# Patient Record
Sex: FEMALE | Race: BLACK OR AFRICAN AMERICAN | NOT HISPANIC OR LATINO
[De-identification: names, ages, dates, MRNs, and addresses within clinical notes are randomized per-mention and may not be internally consistent; named-entity substitution may affect disease eponyms.]

---

## 2018-03-24 ENCOUNTER — TRANSCRIPTION ENCOUNTER (OUTPATIENT)
Age: 33
End: 2018-03-24

## 2018-03-25 ENCOUNTER — RESULT REVIEW (OUTPATIENT)
Age: 33
End: 2018-03-25

## 2018-03-25 ENCOUNTER — INPATIENT (INPATIENT)
Facility: HOSPITAL | Age: 33
LOS: 0 days | Discharge: HOME | End: 2018-03-25
Attending: OBSTETRICS & GYNECOLOGY | Admitting: OBSTETRICS & GYNECOLOGY
Payer: COMMERCIAL

## 2018-03-25 VITALS
HEART RATE: 71 BPM | DIASTOLIC BLOOD PRESSURE: 80 MMHG | TEMPERATURE: 98 F | OXYGEN SATURATION: 100 % | RESPIRATION RATE: 20 BRPM | SYSTOLIC BLOOD PRESSURE: 128 MMHG

## 2018-03-25 VITALS
OXYGEN SATURATION: 100 % | RESPIRATION RATE: 16 BRPM | HEART RATE: 66 BPM | DIASTOLIC BLOOD PRESSURE: 85 MMHG | SYSTOLIC BLOOD PRESSURE: 134 MMHG

## 2018-03-25 DIAGNOSIS — N83.519 TORSION OF OVARY AND OVARIAN PEDICLE, UNSPECIFIED SIDE: ICD-10-CM

## 2018-03-25 LAB
ALBUMIN SERPL ELPH-MCNC: 4.2 G/DL — SIGNIFICANT CHANGE UP (ref 3.3–5)
ALP SERPL-CCNC: 47 U/L — SIGNIFICANT CHANGE UP (ref 40–120)
ALT FLD-CCNC: 14 U/L — SIGNIFICANT CHANGE UP (ref 4–33)
APPEARANCE UR: SIGNIFICANT CHANGE UP
APTT BLD: 31.3 SEC — SIGNIFICANT CHANGE UP (ref 27.5–37.4)
AST SERPL-CCNC: 18 U/L — SIGNIFICANT CHANGE UP (ref 4–32)
BASOPHILS # BLD AUTO: 0.04 K/UL — SIGNIFICANT CHANGE UP (ref 0–0.2)
BASOPHILS NFR BLD AUTO: 0.6 % — SIGNIFICANT CHANGE UP (ref 0–2)
BILIRUB SERPL-MCNC: 0.3 MG/DL — SIGNIFICANT CHANGE UP (ref 0.2–1.2)
BILIRUB UR-MCNC: NEGATIVE — SIGNIFICANT CHANGE UP
BLD GP AB SCN SERPL QL: NEGATIVE — SIGNIFICANT CHANGE UP
BLOOD UR QL VISUAL: HIGH
BUN SERPL-MCNC: 10 MG/DL — SIGNIFICANT CHANGE UP (ref 7–23)
CALCIUM SERPL-MCNC: 8.7 MG/DL — SIGNIFICANT CHANGE UP (ref 8.4–10.5)
CHLORIDE SERPL-SCNC: 106 MMOL/L — SIGNIFICANT CHANGE UP (ref 98–107)
CO2 SERPL-SCNC: 23 MMOL/L — SIGNIFICANT CHANGE UP (ref 22–31)
COLOR SPEC: SIGNIFICANT CHANGE UP
CREAT SERPL-MCNC: 0.91 MG/DL — SIGNIFICANT CHANGE UP (ref 0.5–1.3)
EOSINOPHIL # BLD AUTO: 0.07 K/UL — SIGNIFICANT CHANGE UP (ref 0–0.5)
EOSINOPHIL NFR BLD AUTO: 1 % — SIGNIFICANT CHANGE UP (ref 0–6)
GLUCOSE SERPL-MCNC: 96 MG/DL — SIGNIFICANT CHANGE UP (ref 70–99)
GLUCOSE UR-MCNC: NEGATIVE — SIGNIFICANT CHANGE UP
HCG SERPL-ACNC: < 5 MIU/ML — SIGNIFICANT CHANGE UP
HCT VFR BLD CALC: 37.4 % — SIGNIFICANT CHANGE UP (ref 34.5–45)
HGB BLD-MCNC: 12.4 G/DL — SIGNIFICANT CHANGE UP (ref 11.5–15.5)
IMM GRANULOCYTES # BLD AUTO: 0.01 # — SIGNIFICANT CHANGE UP
IMM GRANULOCYTES NFR BLD AUTO: 0.1 % — SIGNIFICANT CHANGE UP (ref 0–1.5)
INR BLD: 1.03 — SIGNIFICANT CHANGE UP (ref 0.88–1.17)
KETONES UR-MCNC: NEGATIVE — SIGNIFICANT CHANGE UP
LEUKOCYTE ESTERASE UR-ACNC: NEGATIVE — SIGNIFICANT CHANGE UP
LIDOCAIN IGE QN: 44.7 U/L — SIGNIFICANT CHANGE UP (ref 7–60)
LYMPHOCYTES # BLD AUTO: 1.31 K/UL — SIGNIFICANT CHANGE UP (ref 1–3.3)
LYMPHOCYTES # BLD AUTO: 19.1 % — SIGNIFICANT CHANGE UP (ref 13–44)
MCHC RBC-ENTMCNC: 28.1 PG — SIGNIFICANT CHANGE UP (ref 27–34)
MCHC RBC-ENTMCNC: 33.2 % — SIGNIFICANT CHANGE UP (ref 32–36)
MCV RBC AUTO: 84.8 FL — SIGNIFICANT CHANGE UP (ref 80–100)
MONOCYTES # BLD AUTO: 0.41 K/UL — SIGNIFICANT CHANGE UP (ref 0–0.9)
MONOCYTES NFR BLD AUTO: 6 % — SIGNIFICANT CHANGE UP (ref 2–14)
MUCOUS THREADS # UR AUTO: SIGNIFICANT CHANGE UP
NEUTROPHILS # BLD AUTO: 5.01 K/UL — SIGNIFICANT CHANGE UP (ref 1.8–7.4)
NEUTROPHILS NFR BLD AUTO: 73.2 % — SIGNIFICANT CHANGE UP (ref 43–77)
NITRITE UR-MCNC: NEGATIVE — SIGNIFICANT CHANGE UP
NON-SQ EPI CELLS # UR AUTO: <1 — SIGNIFICANT CHANGE UP
NRBC # FLD: 0 — SIGNIFICANT CHANGE UP
PH UR: 6 — SIGNIFICANT CHANGE UP (ref 4.6–8)
PLATELET # BLD AUTO: 236 K/UL — SIGNIFICANT CHANGE UP (ref 150–400)
PMV BLD: 9.9 FL — SIGNIFICANT CHANGE UP (ref 7–13)
POTASSIUM SERPL-MCNC: 4.1 MMOL/L — SIGNIFICANT CHANGE UP (ref 3.5–5.3)
POTASSIUM SERPL-SCNC: 4.1 MMOL/L — SIGNIFICANT CHANGE UP (ref 3.5–5.3)
PROT SERPL-MCNC: 7.5 G/DL — SIGNIFICANT CHANGE UP (ref 6–8.3)
PROT UR-MCNC: 30 MG/DL — HIGH
PROTHROM AB SERPL-ACNC: 11.9 SEC — SIGNIFICANT CHANGE UP (ref 9.8–13.1)
RBC # BLD: 4.41 M/UL — SIGNIFICANT CHANGE UP (ref 3.8–5.2)
RBC # FLD: 13.2 % — SIGNIFICANT CHANGE UP (ref 10.3–14.5)
RBC CASTS # UR COMP ASSIST: SIGNIFICANT CHANGE UP (ref 0–?)
RH IG SCN BLD-IMP: POSITIVE — SIGNIFICANT CHANGE UP
SODIUM SERPL-SCNC: 142 MMOL/L — SIGNIFICANT CHANGE UP (ref 135–145)
SP GR SPEC: 1.02 — SIGNIFICANT CHANGE UP (ref 1–1.04)
SQUAMOUS # UR AUTO: SIGNIFICANT CHANGE UP
UROBILINOGEN FLD QL: NORMAL MG/DL — SIGNIFICANT CHANGE UP
WBC # BLD: 6.85 K/UL — SIGNIFICANT CHANGE UP (ref 3.8–10.5)
WBC # FLD AUTO: 6.85 K/UL — SIGNIFICANT CHANGE UP (ref 3.8–10.5)
WBC UR QL: SIGNIFICANT CHANGE UP (ref 0–?)

## 2018-03-25 PROCEDURE — 74177 CT ABD & PELVIS W/CONTRAST: CPT | Mod: 26

## 2018-03-25 PROCEDURE — 88305 TISSUE EXAM BY PATHOLOGIST: CPT | Mod: 26

## 2018-03-25 PROCEDURE — 76830 TRANSVAGINAL US NON-OB: CPT | Mod: 26

## 2018-03-25 PROCEDURE — 88307 TISSUE EXAM BY PATHOLOGIST: CPT | Mod: 26

## 2018-03-25 RX ORDER — OXYCODONE HYDROCHLORIDE 5 MG/1
1 TABLET ORAL
Qty: 10 | Refills: 0
Start: 2018-03-25

## 2018-03-25 RX ORDER — ACETAMINOPHEN 500 MG
650 TABLET ORAL ONCE
Qty: 0 | Refills: 0 | Status: COMPLETED | OUTPATIENT
Start: 2018-03-25 | End: 2018-03-25

## 2018-03-25 RX ORDER — SODIUM CHLORIDE 9 MG/ML
1000 INJECTION INTRAMUSCULAR; INTRAVENOUS; SUBCUTANEOUS ONCE
Qty: 0 | Refills: 0 | Status: COMPLETED | OUTPATIENT
Start: 2018-03-25 | End: 2018-03-25

## 2018-03-25 RX ORDER — OXYCODONE HYDROCHLORIDE 5 MG/1
5 TABLET ORAL ONCE
Qty: 0 | Refills: 0 | Status: DISCONTINUED | OUTPATIENT
Start: 2018-03-25 | End: 2018-03-25

## 2018-03-25 RX ORDER — SODIUM CHLORIDE 9 MG/ML
1000 INJECTION, SOLUTION INTRAVENOUS
Qty: 0 | Refills: 0 | Status: DISCONTINUED | OUTPATIENT
Start: 2018-03-25 | End: 2018-03-25

## 2018-03-25 RX ORDER — OXYCODONE HYDROCHLORIDE 5 MG/1
1 TABLET ORAL
Qty: 10 | Refills: 0 | OUTPATIENT
Start: 2018-03-25

## 2018-03-25 RX ORDER — FENTANYL CITRATE 50 UG/ML
25 INJECTION INTRAVENOUS
Qty: 0 | Refills: 0 | Status: DISCONTINUED | OUTPATIENT
Start: 2018-03-25 | End: 2018-03-25

## 2018-03-25 RX ADMIN — FENTANYL CITRATE 25 MICROGRAM(S): 50 INJECTION INTRAVENOUS at 21:05

## 2018-03-25 RX ADMIN — OXYCODONE HYDROCHLORIDE 5 MILLIGRAM(S): 5 TABLET ORAL at 22:20

## 2018-03-25 RX ADMIN — Medication 650 MILLIGRAM(S): at 08:57

## 2018-03-25 RX ADMIN — SODIUM CHLORIDE 1000 MILLILITER(S): 9 INJECTION INTRAMUSCULAR; INTRAVENOUS; SUBCUTANEOUS at 08:57

## 2018-03-25 RX ADMIN — FENTANYL CITRATE 25 MICROGRAM(S): 50 INJECTION INTRAVENOUS at 21:20

## 2018-03-25 RX ADMIN — Medication 650 MILLIGRAM(S): at 14:34

## 2018-03-25 NOTE — ED PROVIDER NOTE - CHIEF COMPLAINT
The patient is a 32y Female complaining of The patient is a 32y Female complaining of abdominal pain

## 2018-03-25 NOTE — ASU DISCHARGE PLAN (ADULT/PEDIATRIC). - NOTIFY
Pain not relieved by Medications/Unable to Urinate/Bleeding that does not stop/Swelling that continues/Inability to Tolerate Liquids or Foods/GYN Fever>100.4/Numbness, color, or temperature change to extremity/Persistent Nausea and Vomiting

## 2018-03-25 NOTE — ED PROVIDER NOTE - ATTENDING CONTRIBUTION TO CARE
Raymond BRYAN- 31 Y/O F with h/o ectopic pregnancy  LMP today, prior to that 18 p/w left gorin pain for 3 days worst today  but no dysuria, hematuria. Pt has nausea, vomiting. diarrhea for 2 days btu that has resolved and she got her periods today, appears like her regular periods. No fever, chills, vag discharge.     pt is alert, well appearing female, s1s2 normal reg, b/l clear breath sounds, abd soft, nt, nd, normal bowel sounds, no hernias noted in groin or femoral area, pelvic exam normal, mild menstrual blood, os closed, no adnexal tenderness, uterus intrapelvic non tender, no cmt, neuro exam aox3, cn 2-12 intact, 5/5 strenght in all ext, full rom at all joints, skin warm, dry, good turgor, normal ext pulses    ddx includes dysmenorrhea, fibroid degeneration, ovarian cyst, unlikely torsion due to consisent pain, will r/o pregnancy

## 2018-03-25 NOTE — ED PROVIDER NOTE - MEDICAL DECISION MAKING DETAILS
32F, pmh of ectopic pregnancy presenting with LLQ pain. no tenderness on abdominal or pelvic exam. low concern for ectopic or torsion but cannot r/o. plan for cbc, cmp, hcg, lipase, coags, transvaginal u/s. will reassess.

## 2018-03-25 NOTE — ED PROVIDER NOTE - PHYSICAL EXAMINATION
General: well appearing female in no acute distress   Respiratory: normal work of breathing, lungs clear to auscultation bilaterally   Cardiac: regular rate and rhythm   Abdomen: soft, non-tender, no CVA tenderness   : normal external female genitalia, blood in vaginal vault, cervix without lesions, no CMT or adnexal tenderness   MSK: no swelling or tenderness of lower extremities   Neuro: A&Ox3 General: well appearing female in no acute distress   Respiratory: normal work of breathing, lungs clear to auscultation bilaterally   Cardiac: regular rate and rhythm   Abdomen: soft, non-tender, no CVA tenderness   : normal external female genitalia, blood in vaginal vault, cervix without lesions, no CMT or adnexal tenderness (chaperone Dr. Andrade)   MSK: no swelling or tenderness of lower extremities   Neuro: A&Ox3

## 2018-03-25 NOTE — PROGRESS NOTE ADULT - GENITOURINARY FEMALE
normal external genitalia/bleeding, (menses)  mass in the right pelvis palpated ,  [place] the right side has a mass the left side is tender.

## 2018-03-25 NOTE — PROGRESS NOTE ADULT - SUBJECTIVE AND OBJECTIVE BOX
18 @ 15:10  LAYLA CALVIN  [facility] ED    HPI:  31 y/o  menses started today (3/25) presenting for LLQ pain. Pain started on 3/22, is sharp and "holding" in nature, and was associated with two episodes of vomiting and diarrhea on 3/22. Since 3/22, no further vomiting, but pain has slowly gotten worse. It is constant in nature. She saw her PMD yesterday and was given a naproxen prescription, which helped with the pain but the pain returned once the medication wore off. Pt reports pain was an 8/10 this morning so she came to the ED. Pain is currently a 5/10 after receiving Tylenol in the ED earlier today. Pain is slightly worse with sitting up. No CP, SOB, current N/V, fevers, chills, dysuria. Reports mild vaginal bleeding consistent with her usual menses. No vaginal discharge. Pt sees Dr. Avila for OB/Gyn care.  OBHx: 2x , 1x ectopic pregnancy s/p surgery that pt describes as removal of just the ectopic (not the whole tube) through one incision in her LLQ  GynHx: Reports being told she had fibroids once, no treatment, denies hx of ovarian cysts. Is , has not had sex in a few weeks  PMH: Denies  SHx: Surgery for ectopic as above  Meds: Naproxen  All: NKDA (25 Mar 2018 14:20)        T(C): 37.2 (18 @ 13:52), Max: 37.2 (18 @ 13:52)  HR: 64 (18 @ 13:52) (64 - 71)  BP: 117/75 (18 @ 13:52) (117/75 - 128/80)  RR: 18 (18 @ 13:52) (18 - 20)  SpO2: 100% (18 @ 13:52) (100% - 100%)                        12.4   6.85  )-----------( 236      ( 25 Mar 2018 08:50 )             37.4     notes:              Impression  \  Recommendations:  This 32y Female with    - further recommendations based on clinical progress, laboratory and imaging data

## 2018-03-25 NOTE — H&P ADULT - HISTORY OF PRESENT ILLNESS
33 y/o  menses started today (3/25) presenting for LLQ pain. Pain started on 3/22, is sharp and "holding" in nature, and was associated with two episodes of vomiting and diarrhea on 3/22. Since 3/22, no further vomiting, but pain has slowly gotten worse. It is constant in nature. She saw her PMD yesterday and was given a naproxen prescription, which helped with the pain but the pain returned once the medication wore off. Pt reports pain was an 8/10 this morning so she came to the ED. Pain is currently a 5/10 after receiving Tylenol in the ED earlier today. Pain is slightly worse with sitting up. No CP, SOB, current N/V, fevers, chills, dysuria. Reports mild vaginal bleeding consistent with her usual menses. No vaginal discharge. Pt sees Dr. Avila for OB/Gyn care.  OBHx: 2x , 1x ectopic pregnancy s/p surgery that pt describes as removal of just the ectopic (not the whole tube) through one incision in her LLQ  GynHx: Reports being told she had fibroids once, no treatment, denies hx of ovarian cysts. Is , has not had sex in a few weeks  PMH: Denies  SHx: Surgery for ectopic as above  Meds: Naproxen  All: NKDA

## 2018-03-25 NOTE — H&P ADULT - NSHPPHYSICALEXAM_GEN_ALL_CORE
VS  T(C): 37.2 (03-25-18 @ 13:52)  HR: 64 (03-25-18 @ 13:52)  BP: 117/75 (03-25-18 @ 13:52)  RR: 18 (03-25-18 @ 13:52)  SpO2: 100% (03-25-18 @ 13:52)    Physical Exam:  General: NAD  Abdomen: Soft, non-distended, tender to palpation of LLQ, no rebound, no guarding  Pelvic: Labia wnl w/o lesions or erythema, minimal bleeding in vaginal vault, no cervical discharge or erythema, no CMT, uterus anteverted and not enlarged, adnexa tender to palpation b/l, worse on left than right, left adnexa without masses, palpable adnexal mass in right adnexa that is minimally tender to palpation but less so than her left adnexal region

## 2018-03-25 NOTE — BRIEF OPERATIVE NOTE - OPERATION/FINDINGS
Normal uterus, normal right tube and ovary, left ovary displaced to the right with it's pedicle and left fallopian tube twisted on itself multiple times, enlarged ~7 cm left ovary with multiple ecchymotic cystic lesions

## 2018-03-25 NOTE — H&P ADULT - PROBLEM SELECTOR PLAN 1
- Add on to OR for diagnostic laparoscopy  - NPO, IVF  - Pain medication prn    Pt seen and examined with Dr. Austin Hagan, PGY-2

## 2018-03-25 NOTE — ASU DISCHARGE PLAN (ADULT/PEDIATRIC). - ACTIVITY LEVEL
no tub baths/nothing per rectum/no intercourse/no tampons/nothing per vagina/no douching/weight bearing as tolerated

## 2018-03-25 NOTE — PROGRESS NOTE ADULT - ASSESSMENT
Consent for operation discussed the case with PAM VEGA MD who suggest that a CTscan be done to rule out gastrointestional pathology as a surgical team may be needed.

## 2018-03-25 NOTE — ED ADULT NURSE NOTE - OBJECTIVE STATEMENT
Patient presented to ED A&O x4, with c/o LLQ abdominal pain x 3 days with N/V/D, which has since resolved Blood work drawn and sent to lab.  ER physician evaluated patient, medication and IVF ordered and administered.  Plan is for sono and pending. Will continue to monitor patient closely. Diane KUO

## 2018-03-25 NOTE — H&P ADULT - NSHPLABSRESULTS_GEN_ALL_CORE
12.4   6.85  )-----------( 236      ( 03-25 @ 08:50 )             37.4     PT/INR - ( 03-25 @ 08:50 )   PT: 11.9 SEC;   INR: 1.03     PTT - ( 03-25 @ 08:50 )  PTT:31.3 SEC    03-25 @ 08:50    142  |  106  |  10  ----------------------------<  96  4.1   |  23  |  0.91    Ca    8.7      03-25 @ 08:50    TPro  7.5  /  Alb  4.2  /  TBili  0.3  /  DBili  x   /  AST  18  /  ALT  14  /  AlkPhos  47  03-25 @ 08:50    Blood Type: B Positive  Antibody Screen: Negative    HCG Quantitative, Serum (03.25.18 @ 08:50)    HCG Quantitative, Serum: < 5.0    < from: US Transvaginal (03.25.18 @ 11:11) >    EXAM:  US TRANSVAGINAL        *** ADDENDUM 03/25/2018  ***    Please note, there are two typographical errors in the body of the   report. The last sentence under the "Left Ovary" section should read:   Although Doppler flow is demonstrated to the LEFT, this does not exclude   the possibility of torsion.    In addition, the last sentence under the "Right Ovary" section should   read: Doppler flow is demonstrated to the RIGHT ovary.      *** END OF ADDENDUM 03/25/2018  ***      PROCEDURE DATE:  Mar 25 2018         INTERPRETATION:  CLINICAL INFORMATION: Persistent left lower quadrant   pain for 3 days. Evaluate for torsion. Negative pregnancy test.    LMP: 2/25/2018.    COMPARISON: None available.    TECHNIQUE:     Transabdominal and Endovaginal pelvic sonogram. Color and Spectral   Doppler was performed.    FINDINGS:    Uterus: 8.0 x 4.1 x 5.6 cm. Within normal limits.    Endometrium: 6 mm. Within normal limits.    Left ovary: Left ovary measures 7.9 x 4.1 x 6.8 cm, inclusive of a large   heterogeneous partially cystic mass. Echogenic components of the mass are   suggestive of fat. The left ovary demonstrates heterogeneous echotexture.   Left ovarian location at the midline may be secondary to mass effect or   ovarian torsion. Although Doppler flow is demonstrated to the right   ovary, this does not exclude the possibility of torsion.    Right ovary: 3.8 x 1.9 x 2.0 cm. Within normal limits. Doppler flow is   demonstrated to the 8 ovary.    Fluid: Small volume free fluid within the pelvis.    IMPRESSION:  1.  Large partially cystic mass of the left ovary with echogenic   component suggestive of fat, likely representing a dermoid. Heterogeneous   echotexture of the left ovary with abnormal location at the midline could   suggest left ovarian torsion should the patient have appropriate clinical   symptoms.  2.  Dr. Daniels discussed the above findings with Dr. Andrade of the   emergency department at 11:10 AM on 3/25/2018 with read back.        ***Please see the addendum at the top of this report. It may contain   additional important information or changes.****    < end of copied text >

## 2018-03-25 NOTE — ED PROVIDER NOTE - PROGRESS NOTE DETAILS
u/s concerning for ovarian torsion. plan for gyn consult. patient updated on results. - resident Mikey Alejandra Raymond BRYAN- pt seen by gyn and recommend to admit for lap diagnostic surgery for ovarian torsion

## 2018-03-25 NOTE — ASU DISCHARGE PLAN (ADULT/PEDIATRIC). - MEDICATION SUMMARY - MEDICATIONS TO TAKE
I will START or STAY ON the medications listed below when I get home from the hospital:    ibuprofen 600 mg oral tablet  -- 1 tab(s) by mouth every 6 hours  -- Indication: For Pain    acetaminophen 500 mg oral tablet  -- 2 tab(s) by mouth every 6 hours, As Needed  -- Indication: For Pain    oxyCODONE 5 mg oral tablet  -- 1 tab(s) by mouth every 4 to 6 hours, As Needed -for severe pain MDD:6 tabs   -- Caution federal law prohibits the transfer of this drug to any person other  than the person for whom it was prescribed.  It is very important that you take or use this exactly as directed.  Do not skip doses or discontinue unless directed by your doctor.  May cause drowsiness.  Alcohol may intensify this effect.  Use care when operating dangerous machinery.  This prescription cannot be refilled.  Using more of this medication than prescribed may cause serious breathing problems.    -- Indication: For Pain

## 2018-03-25 NOTE — ED PROVIDER NOTE - OBJECTIVE STATEMENT
32F, PMH of ectopic pregnancy presenting with three days of LLQ pain. Patient reports pain began suddenly while at work, had two episodes of vomiting and diarrhea that day which have since resolved. Pain is worse today, has not moved. Denies fever, back and pelvic pain. Patient began menstruating today, reports this is the expected time.

## 2018-03-25 NOTE — ED ADULT TRIAGE NOTE - CHIEF COMPLAINT QUOTE
Pt with LLQ pain since thurday worse today with nausea and 2 episodes of diarrhea. LMP currently menstruating.

## 2018-03-25 NOTE — BRIEF OPERATIVE NOTE - PROCEDURE
<<-----Click on this checkbox to enter Procedure Laparoscopic oophorectomy, left  03/25/2018    [facility]

## 2018-03-26 LAB
BACTERIA UR CULT: SIGNIFICANT CHANGE UP
SPECIMEN SOURCE: SIGNIFICANT CHANGE UP

## 2018-03-29 LAB — SURGICAL PATHOLOGY STUDY: SIGNIFICANT CHANGE UP

## 2018-04-14 ENCOUNTER — RESULT REVIEW (OUTPATIENT)
Age: 33
End: 2018-04-14

## 2019-06-14 NOTE — PROGRESS NOTE ADULT - BACK
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

## 2022-10-28 PROBLEM — O00.90 UNSPECIFIED ECTOPIC PREGNANCY WITHOUT INTRAUTERINE PREGNANCY: Chronic | Status: ACTIVE | Noted: 2018-03-25

## 2022-11-21 ENCOUNTER — TRANSCRIPTION ENCOUNTER (OUTPATIENT)
Age: 37
End: 2022-11-21

## 2022-11-21 ENCOUNTER — APPOINTMENT (OUTPATIENT)
Dept: OBGYN | Facility: CLINIC | Age: 37
End: 2022-11-21

## 2022-11-21 VITALS
DIASTOLIC BLOOD PRESSURE: 79 MMHG | BODY MASS INDEX: 24.41 KG/M2 | WEIGHT: 143 LBS | SYSTOLIC BLOOD PRESSURE: 129 MMHG | HEIGHT: 64 IN

## 2022-11-21 DIAGNOSIS — N91.2 AMENORRHEA, UNSPECIFIED: ICD-10-CM

## 2022-11-21 DIAGNOSIS — Z86.018 PERSONAL HISTORY OF OTHER BENIGN NEOPLASM: ICD-10-CM

## 2022-11-21 PROCEDURE — 36415 COLL VENOUS BLD VENIPUNCTURE: CPT

## 2022-11-21 PROCEDURE — 99204 OFFICE O/P NEW MOD 45 MIN: CPT

## 2022-11-21 PROCEDURE — P9615: CPT

## 2022-11-21 NOTE — PLAN
[FreeTextEntry1] : 37 year old P2 at 9w6d by LMP presenting with amenorrhea.\par -f/u PAP and GC/CT done today\par -f/u prenatal blood work drawn today\par -Rx sent for PNV\par -f/u 2 weeks for NT/dating. \par

## 2022-11-21 NOTE — HISTORY OF PRESENT ILLNESS
[N] : Patient does not use contraception [Monogamous] : is monogamous [Y] : Positive pregnancy history [Currently Active] : currently active [Men] : men [Vaginal] : vaginal [No] : No [MensesFreq] : 30 [LMPDate] : 09/15/2022 [MensesLength] : 5 [PGHxTotal] : 3 [[facility]Full[facility]] : 2 [PGHxPremature] : 0 [PGHxAbortions] : 0 [[facility]Living] : 2 [FreeTextEntry1] : NSVDX2

## 2022-11-23 LAB
ABO + RH PNL BLD: NORMAL
APPEARANCE: CLEAR
BACTERIA UR CULT: NORMAL
BACTERIA: NEGATIVE
BASOPHILS # BLD AUTO: 0.03 K/UL
BASOPHILS NFR BLD AUTO: 0.3 %
BILIRUBIN URINE: NEGATIVE
BLD GP AB SCN SERPL QL: NORMAL
BLOOD URINE: NEGATIVE
C TRACH RRNA SPEC QL NAA+PROBE: NOT DETECTED
COLOR: NORMAL
EOSINOPHIL # BLD AUTO: 0.08 K/UL
EOSINOPHIL NFR BLD AUTO: 0.7 %
GLUCOSE QUALITATIVE U: NEGATIVE
GLUCOSE SERPL-MCNC: 92 MG/DL
HAV IGM SER QL: NONREACTIVE
HBV CORE IGM SER QL: NONREACTIVE
HBV SURFACE AG SER QL: NONREACTIVE
HCT VFR BLD CALC: 38 %
HCV AB SER QL: NONREACTIVE
HCV S/CO RATIO: 0.06 S/CO
HGB BLD-MCNC: 12.4 G/DL
HIV1+2 AB SPEC QL IA.RAPID: NONREACTIVE
HPV HIGH+LOW RISK DNA PNL CVX: NOT DETECTED
HYALINE CASTS: 1 /LPF
IMM GRANULOCYTES NFR BLD AUTO: 0.3 %
KETONES URINE: NEGATIVE
LEAD BLD-MCNC: <1 UG/DL
LEUKOCYTE ESTERASE URINE: NEGATIVE
LYMPHOCYTES # BLD AUTO: 2.43 K/UL
LYMPHOCYTES NFR BLD AUTO: 20.5 %
MAN DIFF?: NORMAL
MCHC RBC-ENTMCNC: 28.6 PG
MCHC RBC-ENTMCNC: 32.6 GM/DL
MCV RBC AUTO: 87.6 FL
MEV IGG FLD QL IA: 76.9 AU/ML
MEV IGG+IGM SER-IMP: POSITIVE
MICROSCOPIC-UA: NORMAL
MONOCYTES # BLD AUTO: 0.92 K/UL
MONOCYTES NFR BLD AUTO: 7.8 %
MUV AB SER-ACNC: POSITIVE
MUV IGG SER QL IA: >300 AU/ML
N GONORRHOEA RRNA SPEC QL NAA+PROBE: NOT DETECTED
NEUTROPHILS # BLD AUTO: 8.36 K/UL
NEUTROPHILS NFR BLD AUTO: 70.4 %
NITRITE URINE: NEGATIVE
PH URINE: 6
PLATELET # BLD AUTO: 288 K/UL
PROTEIN URINE: NEGATIVE
RBC # BLD: 4.34 M/UL
RBC # FLD: 13.6 %
RED BLOOD CELLS URINE: 1 /HPF
RUBV IGG FLD-ACNC: 13.2 INDEX
RUBV IGG SER-IMP: POSITIVE
SOURCE AMPLIFICATION: NORMAL
SPECIFIC GRAVITY URINE: 1.01
SQUAMOUS EPITHELIAL CELLS: 0 /HPF
T PALLIDUM AB SER QL IA: NEGATIVE
UROBILINOGEN URINE: NORMAL
WBC # FLD AUTO: 11.86 K/UL
WHITE BLOOD CELLS URINE: 0 /HPF

## 2022-11-25 ENCOUNTER — NON-APPOINTMENT (OUTPATIENT)
Age: 37
End: 2022-11-25

## 2022-11-25 LAB
ESTIMATED AVERAGE GLUCOSE: 120 MG/DL
HBA1C MFR BLD HPLC: 5.8 %
HGB A MFR BLD: 55.5 %
HGB A2 MFR BLD: 3.1 %
HGB FRACT BLD-IMP: NORMAL
HGB S BLD QL SOLY: POSITIVE
HGB S MFR BLD: 41.4 %
HPV 16 E6+E7 MRNA CVX QL NAA+PROBE: NOT DETECTED
HPV18+45 E6+E7 MRNA CVX QL NAA+PROBE: NOT DETECTED
M TB IFN-G BLD-IMP: NEGATIVE
QUANTIFERON TB PLUS MITOGEN MINUS NIL: >10 IU/ML
QUANTIFERON TB PLUS NIL: 0.02 IU/ML
QUANTIFERON TB PLUS TB1 MINUS NIL: 0 IU/ML
QUANTIFERON TB PLUS TB2 MINUS NIL: 0 IU/ML

## 2022-11-28 LAB — FMR1 GENE MUT ANL BLD/T: NORMAL

## 2022-11-29 ENCOUNTER — NON-APPOINTMENT (OUTPATIENT)
Age: 37
End: 2022-11-29

## 2022-11-29 LAB — AR GENE MUT ANL BLD/T: NORMAL

## 2022-12-01 ENCOUNTER — NON-APPOINTMENT (OUTPATIENT)
Age: 37
End: 2022-12-01

## 2022-12-01 LAB
CFTR MUT TESTED BLD/T: NEGATIVE
CYTOLOGY CVX/VAG DOC THIN PREP: ABNORMAL

## 2022-12-07 ENCOUNTER — APPOINTMENT (OUTPATIENT)
Dept: ANTEPARTUM | Facility: CLINIC | Age: 37
End: 2022-12-07

## 2022-12-07 ENCOUNTER — APPOINTMENT (OUTPATIENT)
Dept: OBGYN | Facility: CLINIC | Age: 37
End: 2022-12-07

## 2022-12-07 VITALS
BODY MASS INDEX: 24.11 KG/M2 | SYSTOLIC BLOOD PRESSURE: 114 MMHG | WEIGHT: 141.25 LBS | DIASTOLIC BLOOD PRESSURE: 75 MMHG | HEIGHT: 64 IN

## 2022-12-07 PROCEDURE — 76813 OB US NUCHAL MEAS 1 GEST: CPT

## 2022-12-07 PROCEDURE — 76801 OB US < 14 WKS SINGLE FETUS: CPT | Mod: 59

## 2022-12-07 PROCEDURE — 0502F SUBSEQUENT PRENATAL CARE: CPT

## 2022-12-12 ENCOUNTER — NON-APPOINTMENT (OUTPATIENT)
Age: 37
End: 2022-12-12

## 2022-12-12 LAB
ADDITIONAL US: NORMAL
CRL SCAN TWIN B: NORMAL
CRL SCAN: NORMAL
CROWN RUMP LENGTH TWIN B: NORMAL
CROWN RUMP LENGTH: 62.5 MM
DIA MOM: 1.54
DIA VALUE: 369.7 PG/ML
DOWN SYNDROME AGE RISK: NORMAL
DOWN SYNDROME INTERPRETATION: NORMAL
DOWN SYNDROME SCREENING RISK: NORMAL
FIRST TRIMESTER SCREEN COMMENTS: NORMAL
FIRST TRIMESTER SCREEN NOTE: NORMAL
FIRST TRIMESTER SCREEN RESULTS: NORMAL
FIRST TRIMESTER SCREEN TEST RESULTS: NORMAL
GEST. AGE ON COLLECTION DATE: 12.4 WEEKS
HCG MOM: 1.06
HCG VALUE: 109.4 IU/ML
MATERNAL AGE AT EDD: 39.6 YR
NT MOM TWIN B: NORMAL
NT TWIN B: NORMAL
NUCHAL TRANSLUCENCY (NT): 1.4 MM
NUCHAL TRANSLUCENCY MOM: 1.05
NUMBER OF FETUSES: 1
PAPP-A MOM: 0.7
PAPP-A VALUE: 707.9 NG/ML
RACE: NORMAL
SONOGRAPHER ID#: NORMAL
TRISOMY 18 AGE RISK: NORMAL
TRISOMY 18 INTERPRETATION: NORMAL
TRISOMY 18 SCREENING RISK: NORMAL
WEIGHT AFP: 141 LBS

## 2022-12-19 LAB
ALL CHROMOSOMES INTERPRETATION: NORMAL
ALL CHROMOSOMES TEST RESULT: NORMAL
CHROMOSOME13 INTERPRETATION: NORMAL
CHROMOSOME13 TEST RESULT: NORMAL
CHROMOSOME18 INTERPRETATION: NORMAL
CHROMOSOME18 TEST RESULT: NORMAL
CHROMOSOME21 INTERPRETATION: NORMAL
CHROMOSOME21 TEST RESULT: NORMAL
FETAL FRACTION: NORMAL
MICRODELETIONS INTERPRETATION: NORMAL
MICRODELETIONS RESULT: NORMAL
PERFORMANCE AND LIMITATIONS: NORMAL
SEX CHROMOSOME INTERPRETATION: NORMAL
SEX CHROMOSOME TEST RESULT: NORMAL
VERIFI PRENATAL TEST: NOT DETECTED

## 2023-01-04 ENCOUNTER — APPOINTMENT (OUTPATIENT)
Dept: OBGYN | Facility: CLINIC | Age: 38
End: 2023-01-04
Payer: COMMERCIAL

## 2023-01-04 VITALS
BODY MASS INDEX: 24.41 KG/M2 | DIASTOLIC BLOOD PRESSURE: 75 MMHG | SYSTOLIC BLOOD PRESSURE: 109 MMHG | HEIGHT: 64 IN | WEIGHT: 143 LBS

## 2023-01-04 PROCEDURE — 0502F SUBSEQUENT PRENATAL CARE: CPT

## 2023-01-05 ENCOUNTER — APPOINTMENT (OUTPATIENT)
Dept: OBGYN | Facility: CLINIC | Age: 38
End: 2023-01-05
Payer: COMMERCIAL

## 2023-01-05 PROCEDURE — ZZZZZ: CPT

## 2023-01-11 LAB
AFP MOM: 0.73
AFP VALUE: 26.1 NG/ML
ALPHA FETOPROTEIN SERUM COMMENT: NORMAL
ALPHA FETOPROTEIN SERUM INTERPRETATION: NORMAL
ALPHA FETOPROTEIN SERUM RESULTS: NORMAL
ALPHA FETOPROTEIN SERUM TEST RESULTS: NORMAL
GESTATIONAL AGE BASED ON: NORMAL
GESTATIONAL AGE ON COLLECTION DATE: 15.9 WEEKS
INSULIN DEP DIABETES: NO
MATERNAL AGE AT EDD AFP: 39.5 YR
MULTIPLE GESTATION: NO
OSBR RISK 1 IN: NORMAL
RACE: NORMAL
WEIGHT AFP: 143 LBS

## 2023-01-24 ENCOUNTER — NON-APPOINTMENT (OUTPATIENT)
Age: 38
End: 2023-01-24

## 2023-01-24 ENCOUNTER — LABORATORY RESULT (OUTPATIENT)
Age: 38
End: 2023-01-24

## 2023-02-01 ENCOUNTER — APPOINTMENT (OUTPATIENT)
Dept: OBGYN | Facility: CLINIC | Age: 38
End: 2023-02-01
Payer: COMMERCIAL

## 2023-02-01 ENCOUNTER — APPOINTMENT (OUTPATIENT)
Dept: ANTEPARTUM | Facility: CLINIC | Age: 38
End: 2023-02-01
Payer: COMMERCIAL

## 2023-02-01 VITALS — SYSTOLIC BLOOD PRESSURE: 116 MMHG | DIASTOLIC BLOOD PRESSURE: 76 MMHG | BODY MASS INDEX: 25.75 KG/M2 | WEIGHT: 150 LBS

## 2023-02-01 PROCEDURE — 76811 OB US DETAILED SNGL FETUS: CPT

## 2023-02-01 PROCEDURE — 0502F SUBSEQUENT PRENATAL CARE: CPT

## 2023-02-01 PROCEDURE — 36415 COLL VENOUS BLD VENIPUNCTURE: CPT

## 2023-03-01 ENCOUNTER — APPOINTMENT (OUTPATIENT)
Dept: OBGYN | Facility: CLINIC | Age: 38
End: 2023-03-01
Payer: COMMERCIAL

## 2023-03-01 VITALS
SYSTOLIC BLOOD PRESSURE: 118 MMHG | DIASTOLIC BLOOD PRESSURE: 80 MMHG | WEIGHT: 148 LBS | HEIGHT: 64 IN | BODY MASS INDEX: 25.27 KG/M2

## 2023-03-01 PROCEDURE — 0502F SUBSEQUENT PRENATAL CARE: CPT

## 2023-03-01 PROCEDURE — 36415 COLL VENOUS BLD VENIPUNCTURE: CPT

## 2023-03-02 ENCOUNTER — NON-APPOINTMENT (OUTPATIENT)
Age: 38
End: 2023-03-02

## 2023-03-02 LAB
BASOPHILS # BLD AUTO: 0.06 K/UL
BASOPHILS NFR BLD AUTO: 0.5 %
EOSINOPHIL # BLD AUTO: 0.07 K/UL
EOSINOPHIL NFR BLD AUTO: 0.6 %
GLUCOSE 1H P 50 G GLC PO SERPL-MCNC: 124 MG/DL
HCT VFR BLD CALC: 33.6 %
HGB BLD-MCNC: 10.4 G/DL
IMM GRANULOCYTES NFR BLD AUTO: 0.6 %
LYMPHOCYTES # BLD AUTO: 2.02 K/UL
LYMPHOCYTES NFR BLD AUTO: 16 %
MAN DIFF?: NORMAL
MCHC RBC-ENTMCNC: 28.3 PG
MCHC RBC-ENTMCNC: 31 GM/DL
MCV RBC AUTO: 91.3 FL
MONOCYTES # BLD AUTO: 0.89 K/UL
MONOCYTES NFR BLD AUTO: 7.1 %
NEUTROPHILS # BLD AUTO: 9.48 K/UL
NEUTROPHILS NFR BLD AUTO: 75.2 %
PLATELET # BLD AUTO: 274 K/UL
RBC # BLD: 3.68 M/UL
RBC # FLD: 13.9 %
WBC # FLD AUTO: 12.6 K/UL

## 2023-03-29 ENCOUNTER — APPOINTMENT (OUTPATIENT)
Dept: OBGYN | Facility: CLINIC | Age: 38
End: 2023-03-29
Payer: COMMERCIAL

## 2023-03-29 VITALS — SYSTOLIC BLOOD PRESSURE: 114 MMHG | DIASTOLIC BLOOD PRESSURE: 71 MMHG | WEIGHT: 150 LBS | BODY MASS INDEX: 25.75 KG/M2

## 2023-03-29 PROCEDURE — 90715 TDAP VACCINE 7 YRS/> IM: CPT

## 2023-03-29 PROCEDURE — 0502F SUBSEQUENT PRENATAL CARE: CPT

## 2023-03-29 PROCEDURE — 90471 IMMUNIZATION ADMIN: CPT

## 2023-04-17 ENCOUNTER — APPOINTMENT (OUTPATIENT)
Dept: OBGYN | Facility: CLINIC | Age: 38
End: 2023-04-17
Payer: COMMERCIAL

## 2023-04-17 ENCOUNTER — APPOINTMENT (OUTPATIENT)
Dept: ANTEPARTUM | Facility: CLINIC | Age: 38
End: 2023-04-17
Payer: COMMERCIAL

## 2023-04-17 VITALS — WEIGHT: 152 LBS | SYSTOLIC BLOOD PRESSURE: 113 MMHG | BODY MASS INDEX: 26.09 KG/M2 | DIASTOLIC BLOOD PRESSURE: 72 MMHG

## 2023-04-17 PROCEDURE — 76819 FETAL BIOPHYS PROFIL W/O NST: CPT | Mod: 59

## 2023-04-17 PROCEDURE — 0502F SUBSEQUENT PRENATAL CARE: CPT

## 2023-04-17 PROCEDURE — 76816 OB US FOLLOW-UP PER FETUS: CPT

## 2023-05-01 ENCOUNTER — APPOINTMENT (OUTPATIENT)
Dept: OBGYN | Facility: CLINIC | Age: 38
End: 2023-05-01
Payer: COMMERCIAL

## 2023-05-01 VITALS — BODY MASS INDEX: 26.26 KG/M2 | WEIGHT: 153 LBS | SYSTOLIC BLOOD PRESSURE: 117 MMHG | DIASTOLIC BLOOD PRESSURE: 74 MMHG

## 2023-05-01 PROCEDURE — 0502F SUBSEQUENT PRENATAL CARE: CPT

## 2023-05-15 ENCOUNTER — APPOINTMENT (OUTPATIENT)
Dept: OBGYN | Facility: CLINIC | Age: 38
End: 2023-05-15
Payer: COMMERCIAL

## 2023-05-15 VITALS
WEIGHT: 153 LBS | DIASTOLIC BLOOD PRESSURE: 68 MMHG | BODY MASS INDEX: 26.12 KG/M2 | SYSTOLIC BLOOD PRESSURE: 111 MMHG | HEIGHT: 64 IN

## 2023-05-15 DIAGNOSIS — D57.3 SICKLE-CELL TRAIT: ICD-10-CM

## 2023-05-15 PROCEDURE — 0502F SUBSEQUENT PRENATAL CARE: CPT

## 2023-05-19 LAB
FERRITIN SERPL-MCNC: 15 NG/ML
FOLATE SERPL-MCNC: >20 NG/ML
IRON SATN MFR SERPL: 4 %
IRON SERPL-MCNC: 26 UG/DL
TIBC SERPL-MCNC: 618 UG/DL
UIBC SERPL-MCNC: 592 UG/DL
VIT B12 SERPL-MCNC: 677 PG/ML

## 2023-05-31 ENCOUNTER — OUTPATIENT (OUTPATIENT)
Dept: OUTPATIENT SERVICES | Facility: HOSPITAL | Age: 38
LOS: 1 days | Discharge: HOME | End: 2023-05-31

## 2023-05-31 ENCOUNTER — NON-APPOINTMENT (OUTPATIENT)
Age: 38
End: 2023-05-31

## 2023-05-31 ENCOUNTER — APPOINTMENT (OUTPATIENT)
Dept: OBGYN | Facility: CLINIC | Age: 38
End: 2023-05-31
Payer: COMMERCIAL

## 2023-05-31 VITALS — SYSTOLIC BLOOD PRESSURE: 112 MMHG | DIASTOLIC BLOOD PRESSURE: 72 MMHG | BODY MASS INDEX: 26.09 KG/M2 | WEIGHT: 152 LBS

## 2023-05-31 DIAGNOSIS — D64.9 ANEMIA, UNSPECIFIED: ICD-10-CM

## 2023-05-31 PROCEDURE — 0502F SUBSEQUENT PRENATAL CARE: CPT

## 2023-06-01 ENCOUNTER — APPOINTMENT (OUTPATIENT)
Dept: HEMATOLOGY ONCOLOGY | Facility: CLINIC | Age: 38
End: 2023-06-01
Payer: COMMERCIAL

## 2023-06-01 VITALS
HEIGHT: 64 IN | BODY MASS INDEX: 25.86 KG/M2 | SYSTOLIC BLOOD PRESSURE: 104 MMHG | DIASTOLIC BLOOD PRESSURE: 72 MMHG | HEART RATE: 88 BPM | RESPIRATION RATE: 16 BRPM | WEIGHT: 151.46 LBS | OXYGEN SATURATION: 97 %

## 2023-06-01 DIAGNOSIS — Z33.1 PREGNANT STATE, INCIDENTAL: ICD-10-CM

## 2023-06-01 DIAGNOSIS — Z86.32 PERSONAL HISTORY OF GESTATIONAL DIABETES: ICD-10-CM

## 2023-06-01 PROCEDURE — 99205 OFFICE O/P NEW HI 60 MIN: CPT

## 2023-06-01 NOTE — RESULTS/DATA
[FreeTextEntry1] : 6/1/2023\par Review of recent labs \par HGB 9.7\par MCV 84.5\par Ferritin 15\par TIBC 618\par

## 2023-06-01 NOTE — HISTORY OF PRESENT ILLNESS
[de-identified] : (6/1/2023) 37 year-old Ms. CALVIN is seen in consult for anemia in pregnancy at 36 weeks. Patient's most recent (5/31/2023) hemoglobin is 9.7, MCV 84.5. Patient is pregnant with her third child. Patient never needed blood transfusion or iron infusion. She is not on PO iron. Denies any history of bleeding.

## 2023-06-01 NOTE — ASSESSMENT
[FreeTextEntry1] : 37 year-old Ms. CALVIN is seen in consult for for anemia. Of note patient is a sickle cell trait. She gave birth to two healthy babies. She sis not need transfusion support or IV iron. Her most recent blood work is suggestive of Iron deficiency. \par \par \par [] Anemia in pregnancy (36-week) \par - Nutrient deficiency \par - Obtain labs \par - Patient is not taking po iron \par - She can be a candidate for 4-5 doses of IV iron\par - Labs ordered\par -COnsent obtained \par - RTC in 3 months \par \par \par  \par

## 2023-06-02 LAB
ALBUMIN SERPL ELPH-MCNC: 3.5 G/DL
ALP BLD-CCNC: 124 U/L
ALT SERPL-CCNC: 10 U/L
ANION GAP SERPL CALC-SCNC: 12 MMOL/L
AST SERPL-CCNC: 17 U/L
BILIRUB SERPL-MCNC: 0.3 MG/DL
BUN SERPL-MCNC: 5 MG/DL
CALCIUM SERPL-MCNC: 9.6 MG/DL
CHLORIDE SERPL-SCNC: 107 MMOL/L
CO2 SERPL-SCNC: 22 MMOL/L
CREAT SERPL-MCNC: 0.66 MG/DL
EGFR: 114 ML/MIN/1.73M2
FERRITIN SERPL-MCNC: 13 NG/ML
FOLATE SERPL-MCNC: >20 NG/ML
GLUCOSE SERPL-MCNC: 76 MG/DL
IRON SATN MFR SERPL: 6 %
IRON SERPL-MCNC: 36 UG/DL
POTASSIUM SERPL-SCNC: 4.1 MMOL/L
PROT SERPL-MCNC: 6.5 G/DL
SODIUM SERPL-SCNC: 141 MMOL/L
TIBC SERPL-MCNC: 597 UG/DL
TSH SERPL-ACNC: 0.34 UIU/ML
UIBC SERPL-MCNC: 561 UG/DL
VIT B12 SERPL-MCNC: 621 PG/ML

## 2023-06-03 LAB — HIV1+2 AB SPEC QL IA.RAPID: NONREACTIVE

## 2023-06-05 ENCOUNTER — NON-APPOINTMENT (OUTPATIENT)
Age: 38
End: 2023-06-05

## 2023-06-05 LAB — B-HEM STREP SPEC QL CULT: ABNORMAL

## 2023-06-06 LAB — STFR SERPL-MCNC: 38.6 NMOL/L

## 2023-06-07 ENCOUNTER — APPOINTMENT (OUTPATIENT)
Dept: OBGYN | Facility: CLINIC | Age: 38
End: 2023-06-07
Payer: COMMERCIAL

## 2023-06-07 ENCOUNTER — APPOINTMENT (OUTPATIENT)
Dept: ANTEPARTUM | Facility: CLINIC | Age: 38
End: 2023-06-07
Payer: COMMERCIAL

## 2023-06-07 VITALS — BODY MASS INDEX: 26.26 KG/M2 | SYSTOLIC BLOOD PRESSURE: 118 MMHG | WEIGHT: 153 LBS | DIASTOLIC BLOOD PRESSURE: 76 MMHG

## 2023-06-07 PROCEDURE — 0502F SUBSEQUENT PRENATAL CARE: CPT

## 2023-06-07 PROCEDURE — 76816 OB US FOLLOW-UP PER FETUS: CPT

## 2023-06-07 PROCEDURE — 76819 FETAL BIOPHYS PROFIL W/O NST: CPT | Mod: 59

## 2023-06-13 ENCOUNTER — NON-APPOINTMENT (OUTPATIENT)
Age: 38
End: 2023-06-13

## 2023-06-14 ENCOUNTER — APPOINTMENT (OUTPATIENT)
Dept: OBGYN | Facility: CLINIC | Age: 38
End: 2023-06-14
Payer: COMMERCIAL

## 2023-06-14 VITALS — WEIGHT: 153 LBS | BODY MASS INDEX: 26.26 KG/M2 | SYSTOLIC BLOOD PRESSURE: 110 MMHG | DIASTOLIC BLOOD PRESSURE: 71 MMHG

## 2023-06-14 PROCEDURE — 0502F SUBSEQUENT PRENATAL CARE: CPT

## 2023-06-19 ENCOUNTER — APPOINTMENT (OUTPATIENT)
Dept: INFUSION THERAPY | Facility: HOSPITAL | Age: 38
End: 2023-06-19

## 2023-06-20 ENCOUNTER — INPATIENT (INPATIENT)
Facility: HOSPITAL | Age: 38
LOS: 2 days | Discharge: HOME | End: 2023-06-23
Attending: OBSTETRICS & GYNECOLOGY | Admitting: OBSTETRICS & GYNECOLOGY
Payer: COMMERCIAL

## 2023-06-20 VITALS — DIASTOLIC BLOOD PRESSURE: 76 MMHG | HEART RATE: 73 BPM | SYSTOLIC BLOOD PRESSURE: 108 MMHG

## 2023-06-20 DIAGNOSIS — O26.899 OTHER SPECIFIED PREGNANCY RELATED CONDITIONS, UNSPECIFIED TRIMESTER: ICD-10-CM

## 2023-06-20 DIAGNOSIS — D50.9 IRON DEFICIENCY ANEMIA, UNSPECIFIED: ICD-10-CM

## 2023-06-21 ENCOUNTER — TRANSCRIPTION ENCOUNTER (OUTPATIENT)
Age: 38
End: 2023-06-21

## 2023-06-21 DIAGNOSIS — O41.00X0 OLIGOHYDRAMNIOS, UNSPECIFIED TRIMESTER, NOT APPLICABLE OR UNSPECIFIED: ICD-10-CM

## 2023-06-21 DIAGNOSIS — Z90.721 ACQUIRED ABSENCE OF OVARIES, UNILATERAL: Chronic | ICD-10-CM

## 2023-06-21 LAB
BASOPHILS # BLD AUTO: 0.02 K/UL — SIGNIFICANT CHANGE UP (ref 0–0.2)
BASOPHILS NFR BLD AUTO: 0.2 % — SIGNIFICANT CHANGE UP (ref 0–2)
COVID-19 SPIKE DOMAIN AB INTERP: POSITIVE
COVID-19 SPIKE DOMAIN ANTIBODY RESULT: >250 U/ML — HIGH
EOSINOPHIL # BLD AUTO: 0.04 K/UL — SIGNIFICANT CHANGE UP (ref 0–0.5)
EOSINOPHIL NFR BLD AUTO: 0.4 % — SIGNIFICANT CHANGE UP (ref 0–6)
HCT VFR BLD CALC: 29.3 % — LOW (ref 34.5–45)
HGB BLD-MCNC: 9.7 G/DL — LOW (ref 11.5–15.5)
IANC: 6.52 K/UL — SIGNIFICANT CHANGE UP (ref 1.8–7.4)
IMM GRANULOCYTES NFR BLD AUTO: 1.4 % — HIGH (ref 0–0.9)
LYMPHOCYTES # BLD AUTO: 2.26 K/UL — SIGNIFICANT CHANGE UP (ref 1–3.3)
LYMPHOCYTES # BLD AUTO: 22.5 % — SIGNIFICANT CHANGE UP (ref 13–44)
MCHC RBC-ENTMCNC: 26 PG — LOW (ref 27–34)
MCHC RBC-ENTMCNC: 33.1 GM/DL — SIGNIFICANT CHANGE UP (ref 32–36)
MCV RBC AUTO: 78.6 FL — LOW (ref 80–100)
MONOCYTES # BLD AUTO: 1.06 K/UL — HIGH (ref 0–0.9)
MONOCYTES NFR BLD AUTO: 10.6 % — SIGNIFICANT CHANGE UP (ref 2–14)
NEUTROPHILS # BLD AUTO: 6.52 K/UL — SIGNIFICANT CHANGE UP (ref 1.8–7.4)
NEUTROPHILS NFR BLD AUTO: 64.9 % — SIGNIFICANT CHANGE UP (ref 43–77)
NRBC # BLD: 0 /100 WBCS — SIGNIFICANT CHANGE UP (ref 0–0)
NRBC # FLD: 0 K/UL — SIGNIFICANT CHANGE UP (ref 0–0)
PLATELET # BLD AUTO: 261 K/UL — SIGNIFICANT CHANGE UP (ref 150–400)
RBC # BLD: 3.73 M/UL — LOW (ref 3.8–5.2)
RBC # FLD: 14.7 % — HIGH (ref 10.3–14.5)
SARS-COV-2 IGG+IGM SERPL QL IA: >250 U/ML — HIGH
SARS-COV-2 IGG+IGM SERPL QL IA: POSITIVE
T PALLIDUM AB TITR SER: NEGATIVE — SIGNIFICANT CHANGE UP
WBC # BLD: 10.04 K/UL — SIGNIFICANT CHANGE UP (ref 3.8–10.5)
WBC # FLD AUTO: 10.04 K/UL — SIGNIFICANT CHANGE UP (ref 3.8–10.5)

## 2023-06-21 PROCEDURE — 59400 OBSTETRICAL CARE: CPT | Mod: U9,GC

## 2023-06-21 RX ORDER — HYDROCORTISONE 1 %
1 OINTMENT (GRAM) TOPICAL EVERY 6 HOURS
Refills: 0 | Status: DISCONTINUED | OUTPATIENT
Start: 2023-06-21 | End: 2023-06-23

## 2023-06-21 RX ORDER — AMPICILLIN TRIHYDRATE 250 MG
2 CAPSULE ORAL ONCE
Refills: 0 | Status: COMPLETED | OUTPATIENT
Start: 2023-06-21 | End: 2023-06-21

## 2023-06-21 RX ORDER — SODIUM CHLORIDE 9 MG/ML
3 INJECTION INTRAMUSCULAR; INTRAVENOUS; SUBCUTANEOUS EVERY 8 HOURS
Refills: 0 | Status: DISCONTINUED | OUTPATIENT
Start: 2023-06-21 | End: 2023-06-23

## 2023-06-21 RX ORDER — SODIUM CHLORIDE 9 MG/ML
1000 INJECTION, SOLUTION INTRAVENOUS
Refills: 0 | Status: DISCONTINUED | OUTPATIENT
Start: 2023-06-21 | End: 2023-06-21

## 2023-06-21 RX ORDER — AMPICILLIN TRIHYDRATE 250 MG
1 CAPSULE ORAL EVERY 4 HOURS
Refills: 0 | Status: DISCONTINUED | OUTPATIENT
Start: 2023-06-21 | End: 2023-06-21

## 2023-06-21 RX ORDER — ACETAMINOPHEN 500 MG
2 TABLET ORAL
Qty: 0 | Refills: 0 | DISCHARGE

## 2023-06-21 RX ORDER — OXYTOCIN 10 UNIT/ML
333.33 VIAL (ML) INJECTION
Qty: 20 | Refills: 0 | Status: DISCONTINUED | OUTPATIENT
Start: 2023-06-21 | End: 2023-06-21

## 2023-06-21 RX ORDER — ACETAMINOPHEN 500 MG
975 TABLET ORAL
Refills: 0 | Status: DISCONTINUED | OUTPATIENT
Start: 2023-06-21 | End: 2023-06-23

## 2023-06-21 RX ORDER — DIBUCAINE 1 %
1 OINTMENT (GRAM) RECTAL EVERY 6 HOURS
Refills: 0 | Status: DISCONTINUED | OUTPATIENT
Start: 2023-06-21 | End: 2023-06-23

## 2023-06-21 RX ORDER — AER TRAVELER 0.5 G/1
1 SOLUTION RECTAL; TOPICAL EVERY 4 HOURS
Refills: 0 | Status: DISCONTINUED | OUTPATIENT
Start: 2023-06-21 | End: 2023-06-23

## 2023-06-21 RX ORDER — MAGNESIUM HYDROXIDE 400 MG/1
30 TABLET, CHEWABLE ORAL
Refills: 0 | Status: DISCONTINUED | OUTPATIENT
Start: 2023-06-21 | End: 2023-06-23

## 2023-06-21 RX ORDER — OXYTOCIN 10 UNIT/ML
2 VIAL (ML) INJECTION
Qty: 30 | Refills: 0 | Status: DISCONTINUED | OUTPATIENT
Start: 2023-06-21 | End: 2023-06-22

## 2023-06-21 RX ORDER — KETOROLAC TROMETHAMINE 30 MG/ML
30 SYRINGE (ML) INJECTION ONCE
Refills: 0 | Status: DISCONTINUED | OUTPATIENT
Start: 2023-06-21 | End: 2023-06-22

## 2023-06-21 RX ORDER — OXYCODONE HYDROCHLORIDE 5 MG/1
5 TABLET ORAL ONCE
Refills: 0 | Status: DISCONTINUED | OUTPATIENT
Start: 2023-06-21 | End: 2023-06-23

## 2023-06-21 RX ORDER — IBUPROFEN 200 MG
600 TABLET ORAL EVERY 6 HOURS
Refills: 0 | Status: COMPLETED | OUTPATIENT
Start: 2023-06-21 | End: 2024-05-19

## 2023-06-21 RX ORDER — CHLORHEXIDINE GLUCONATE 213 G/1000ML
1 SOLUTION TOPICAL DAILY
Refills: 0 | Status: DISCONTINUED | OUTPATIENT
Start: 2023-06-21 | End: 2023-06-21

## 2023-06-21 RX ORDER — LANOLIN
1 OINTMENT (GRAM) TOPICAL EVERY 6 HOURS
Refills: 0 | Status: DISCONTINUED | OUTPATIENT
Start: 2023-06-21 | End: 2023-06-23

## 2023-06-21 RX ORDER — SODIUM CHLORIDE 9 MG/ML
1000 INJECTION, SOLUTION INTRAVENOUS ONCE
Refills: 0 | Status: COMPLETED | OUTPATIENT
Start: 2023-06-21 | End: 2023-06-21

## 2023-06-21 RX ORDER — OXYTOCIN 10 UNIT/ML
41.67 VIAL (ML) INJECTION
Qty: 20 | Refills: 0 | Status: DISCONTINUED | OUTPATIENT
Start: 2023-06-21 | End: 2023-06-22

## 2023-06-21 RX ORDER — BENZOCAINE 10 %
1 GEL (GRAM) MUCOUS MEMBRANE EVERY 6 HOURS
Refills: 0 | Status: DISCONTINUED | OUTPATIENT
Start: 2023-06-21 | End: 2023-06-23

## 2023-06-21 RX ORDER — DIPHENHYDRAMINE HCL 50 MG
25 CAPSULE ORAL EVERY 6 HOURS
Refills: 0 | Status: DISCONTINUED | OUTPATIENT
Start: 2023-06-21 | End: 2023-06-23

## 2023-06-21 RX ORDER — TETANUS TOXOID, REDUCED DIPHTHERIA TOXOID AND ACELLULAR PERTUSSIS VACCINE, ADSORBED 5; 2.5; 8; 8; 2.5 [IU]/.5ML; [IU]/.5ML; UG/.5ML; UG/.5ML; UG/.5ML
0.5 SUSPENSION INTRAMUSCULAR ONCE
Refills: 0 | Status: DISCONTINUED | OUTPATIENT
Start: 2023-06-21 | End: 2023-06-23

## 2023-06-21 RX ORDER — SIMETHICONE 80 MG/1
80 TABLET, CHEWABLE ORAL EVERY 4 HOURS
Refills: 0 | Status: DISCONTINUED | OUTPATIENT
Start: 2023-06-21 | End: 2023-06-23

## 2023-06-21 RX ORDER — PRAMOXINE HYDROCHLORIDE 150 MG/15G
1 AEROSOL, FOAM RECTAL EVERY 4 HOURS
Refills: 0 | Status: DISCONTINUED | OUTPATIENT
Start: 2023-06-21 | End: 2023-06-23

## 2023-06-21 RX ORDER — OXYCODONE HYDROCHLORIDE 5 MG/1
5 TABLET ORAL
Refills: 0 | Status: DISCONTINUED | OUTPATIENT
Start: 2023-06-21 | End: 2023-06-23

## 2023-06-21 RX ADMIN — Medication 108 GRAM(S): at 18:00

## 2023-06-21 RX ADMIN — Medication 125 MILLIUNIT(S)/MIN: at 20:56

## 2023-06-21 RX ADMIN — Medication 108 GRAM(S): at 14:00

## 2023-06-21 RX ADMIN — CHLORHEXIDINE GLUCONATE 1 APPLICATION(S): 213 SOLUTION TOPICAL at 02:18

## 2023-06-21 RX ADMIN — SODIUM CHLORIDE 1000 MILLILITER(S): 9 INJECTION, SOLUTION INTRAVENOUS at 03:47

## 2023-06-21 RX ADMIN — Medication 108 GRAM(S): at 06:00

## 2023-06-21 RX ADMIN — SODIUM CHLORIDE 125 MILLILITER(S): 9 INJECTION, SOLUTION INTRAVENOUS at 02:12

## 2023-06-21 RX ADMIN — Medication 108 GRAM(S): at 10:02

## 2023-06-21 RX ADMIN — Medication 2 MILLIUNIT(S)/MIN: at 16:00

## 2023-06-21 RX ADMIN — Medication 200 GRAM(S): at 02:12

## 2023-06-21 NOTE — CHART NOTE - NSCHARTNOTEFT_GEN_A_CORE
Pt comfortable - balloon fell out approximately 540p  NST categ 2 - variable decels, moderate variability  ve 5-6/80/-2  arom claf  IUPC and amnioinfusion placed  restart pitocin

## 2023-06-21 NOTE — OB PROVIDER LABOR PROGRESS NOTE - ASSESSMENT
@39.6wks Anhydramnios IOL  Patient on all fours with recovery noted in FHR baseline  Cervical change noted  Cont with intrauterine resuscitation   Cont EFM/TOCO  Anticipate   Will reassess PRN    fauzia Gonzalez NP 
IOL due to Anhydramnios  Epidural in place  Continue EFM and toco  cooks balloon in place  Reassess in 1 hr  Consider Pitocin augmentation    Dr. Benton updated    ANTOINE, PAOLA Singh CNM

## 2023-06-21 NOTE — OB PROVIDER H&P - NSLOWPPHRISK_OBGYN_A_OB
No previous uterine incision/[place] Pregnancy/Less than or equal to 4 previous vaginal births/No known bleeding disorder/No history of postpartum hemorrhage/No other PPH risks indicated

## 2023-06-21 NOTE — DISCHARGE NOTE OB - PATIENT PORTAL LINK FT
normal...
You can access the FollowMyHealth Patient Portal offered by [facility] by registering at the following website: [email or web address]. By joining [email or web address]’s FollowMyHealth portal, you will also be able to view your health information using other applications (apps) compatible with our system.

## 2023-06-21 NOTE — OB PROVIDER IHI INDUCTION/AUGMENTATION NOTE - LABOR: FETAL STATION
-3 .  LABS:                         9.7    4.71  )-----------( 208      ( 08 May 2021 11:09 )             33.2         138  |  97<L>  |  18  ----------------------------<  135<H>  4.4   |  28  |  7.45<H>    Ca    8.8      08 May 2021 11:09    TPro  7.8  /  Alb  3.7  /  TBili  0.4  /  DBili  x   /  AST  17  /  ALT  6   /  AlkPhos  66        Urinalysis Basic - ( 08 May 2021 11:19 )    Color: Yellow / Appearance: Slightly Turbid / S.017 / pH: x  Gluc: x / Ketone: Negative  / Bili: Negative / Urobili: <2 mg/dL   Blood: x / Protein: >600 mg/dL / Nitrite: Negative   Leuk Esterase: Moderate / RBC: 1 /HPF / WBC 61 /HPF   Sq Epi: x / Non Sq Epi: 9 /HPF / Bacteria: Few        RADIOLOGY, EKG & ADDITIONAL TESTS: Reviewed.

## 2023-06-21 NOTE — OB PROVIDER H&P - NSHPPHYSICALEXAM_GEN_ALL_CORE
ICU Vital Signs Last 24 Hrs  T(C): 37.1 (21 Jun 2023 00:00), Max: 37.1 (21 Jun 2023 00:00)  T(F): 98.8 (21 Jun 2023 00:00), Max: 98.8 (21 Jun 2023 00:00)  HR: 69 (21 Jun 2023 00:51) (69 - 73)  BP: 109/72 (21 Jun 2023 00:51) (106/67 - 109/72)  BP(mean): --  ABP: --  ABP(mean): --  RR: 16 (21 Jun 2023 00:00) (16 - 16)  SpO2: --    Abdomen soft nontender  TAS: Cephalic presentation, posterior placenta, JO-ANN: 0, BPP: 6/8   Speculum: Negative pooling, nitrazine and fern   SVE: 2.5/60/-3   GBS: Pos. ()   FHR: 135bpm, moderate variability, accels, no decels  Yvonne every 10-15mins ICU Vital Signs Last 24 Hrs  T(C): 37.1 (21 Jun 2023 00:00), Max: 37.1 (21 Jun 2023 00:00)  T(F): 98.8 (21 Jun 2023 00:00), Max: 98.8 (21 Jun 2023 00:00)  HR: 69 (21 Jun 2023 00:51) (69 - 73)  BP: 109/72 (21 Jun 2023 00:51) (106/67 - 109/72)  BP(mean): --  ABP: --  ABP(mean): --  RR: 16 (21 Jun 2023 00:00) (16 - 16)  SpO2: --    Abdomen soft nontender  TAS: Cephalic presentation, posterior placenta, JO-ANN: 0, BPP: 6/8   Speculum: Negative pooling, nitrazine and fern   SVE: 2.5/60/-3   EFW: 3200gms by leopolds   GBS: Pos. (6/2/23)   FHR: 135bpm, moderate variability, accels, no decels  Yvonne every 10-15mins

## 2023-06-21 NOTE — OB PROVIDER TRIAGE NOTE - NSHPPHYSICALEXAM_GEN_ALL_CORE
ICU Vital Signs Last 24 Hrs  T(C): 37.1 (21 Jun 2023 00:00), Max: 37.1 (21 Jun 2023 00:00)  T(F): 98.8 (21 Jun 2023 00:00), Max: 98.8 (21 Jun 2023 00:00)  HR: 73 (21 Jun 2023 00:00) (73 - 73)  BP: 108/76 (21 Jun 2023 00:00) (108/76 - 108/76)  BP(mean): --  ABP: --  ABP(mean): --  RR: 16 (21 Jun 2023 00:00) (16 - 16)  SpO2: --    Abdomen soft nontender  SVE:  TAS:

## 2023-06-21 NOTE — DISCHARGE NOTE OB - NS MD DC FALL RISK RISK
For information on Fall & Injury Prevention, visit: [email or web address] OR  [email or web address] OR  https://www.cdc.gov/steadi/patient.html

## 2023-06-21 NOTE — OB RN PATIENT PROFILE - HEIGHT IN FEET
Return to the ED immediately for any worsening of symptoms, fever, inability or change in gait from increase in pain, bowel or bladder incontinence, difficulty/inability or pain with urination, numbness or tingling to private region or lower extremities,dizziness, weakness or any concerns.      5

## 2023-06-21 NOTE — OB PROVIDER LABOR PROGRESS NOTE - NS_SUBJECTIVE/OBJECTIVE_OBGYN_ALL_OB_FT
Paged to bedside by RN for FHR deceleration. Effective epidural in place. Patient comfortable.  VS  T(C): 36.7 (06-21-23 @ 18:00)  HR: 78 (06-21-23 @ 19:32)  BP: 107/59 (06-21-23 @ 19:09)  RR: 20 (06-21-23 @ 18:00)  SpO2: 92% (06-21-23 @ 19:32)
Pt seen for labor progress  Epidural in place and pt has no complaints of pain at this time.   Cooks balloon placed 60ml/60ml- pt tolerated procedure well.
complains of pain/discomfort

## 2023-06-21 NOTE — OB PROVIDER H&P - HISTORY OF PRESENT ILLNESS
Pt. is a 38y/o  EGA 39.6wks reports of painful contractions and decrease fetal movement. Pt. denies leakage of fluid and vaginal bleeding.     AP: Anemia - Iron transfusion 23

## 2023-06-21 NOTE — OB PROVIDER TRIAGE NOTE - HISTORY OF PRESENT ILLNESS
Pt. is a 38y/o  EGA 39.5wks reports of painful contractions and decrease fetal movement. Pt. denies leakage of fluid and vaginal bleeding.

## 2023-06-21 NOTE — DISCHARGE NOTE OB - MEDICATION SUMMARY - MEDICATIONS TO CHANGE
2700 [facility] [address] [place] [address] 
[phone] Patient: Eliz Sanz MRN: [id number] You are allergic to the following Allergen Reactions Penicillins Unknown (comments) Told by mother she was allergic Recent Documentation Height Weight Breastfeeding? BMI OB Status Smoking Status 1.676 m 84.8 kg No 30.18 kg/m2 Pregnant Never Smoker Emergency Contacts Name Discharge Info Relation Home Work Mobile Christian Trujillo DISCHARGE CAREGIVER [3] Spouse [3] [phone] About your hospitalization You were admitted on:  May 11, 2017 You last received care in the:  [address] [place] [address] ANTEPARTUM/MI You were discharged on:  May 12, 2017 Unit phone number:  [phone] Why you were hospitalized Your primary diagnosis was:  Not on File Providers Seen During Your Hospitalizations Provider Role Specialty Primary office phone Della Garcia MD Attending Provider Gynecology [phone] Your Primary Care Physician (PCP) Primary Care Physician Office Phone Office Fax Aliyah Calles [phone] Follow-up Information Follow up With Details Comments Contact Info Della Garcia MD Schedule an appointment as soon as possible for a visit on 5/18/2017  [address] [place] [address] 201 [address] [place] [address] 
[phone] Della Garcia MD On 5/18/2017 Dr. Vernell Ramirez nurse will call patient with time for appointment on Thursday, May 18, 2017 [address] [place] [address] 201 1400 [address] [place] [address] 
[phone] Current Discharge Medication List  
  
CONTINUE these medications which have NOT CHANGED Dose & Instructions Dispensing Information Comments Morning Noon Evening Bedtime  
 levothyroxine 137 mcg tablet Commonly known as:  SYNTHROID Your last dose was: Your next dose is:    
   
   
 Dose:  100 mcg Take 100 mcg by mouth Daily (before breakfast). Indications: hypothyroidism Refills:  0 PRENATAL PLUS (CALCIUM CARB) 27 mg iron- 1 mg Tab Generic drug:  prenatal vit-calcium-iron-fa Your last dose was: Your next dose is:    
   
   
 Dose:  1 Tab Take 1 Tab by mouth daily. Indications: Pregnancy Refills:  0 Discharge Instructions Threatened Miscarriage: Care Instructions Your Care Instructions Some women have light spotting or bleeding during the first 12 weeks of pregnancy. In some cases this is normal. Light spotting or bleeding can also be a sign of a possible loss of the pregnancy. This is called a threatened miscarriage. At this point, the doctor may not be able to tell if your vaginal bleeding is normal or is a sign of a miscarriage. In early pregnancy, things such as stress, exercise, and sex do not cause miscarriage. You may be worried or upset about the possibility of losing your pregnancy. But do not blame yourself. There is no treatment to stop a threatened miscarriage. If you do have a miscarriage, there was nothing you could have done to prevent it. A miscarriage usually means that the pregnancy is not developing normally. The doctor has checked you carefully, but problems can develop later. If you notice any problems or new symptoms, get medical treatment right away. Follow-up care is a key part of your treatment and safety. Be sure to make and go to all appointments, and call your doctor if you are having problems. It's also a good idea to know your test results and keep a list of the medicines you take. How can you care for yourself at home? · If you do have a miscarriage, you will probably have some vaginal bleeding for 1 to 2 weeks. Use pads instead of tampons. · Take acetaminophen (Tylenol) for cramps. Read and follow all instructions on the label. · Do not take two or more pain medicines at the same time unless the doctor told you to. Many pain medicines have acetaminophen, which is Tylenol. Too much acetaminophen (Tylenol) can be harmful. · Do not have sex until your doctor says it is okay. · Get lots of rest over the next several days. · You may do your normal activities if you feel well enough to do them. But do not do any heavy exercise until your doctor says it is okay. · Eat a balanced diet that is high in iron and vitamin C. Foods rich in iron include red meat, shellfish, eggs, beans, and leafy green vegetables. Foods high in vitamin C include citrus fruits, tomatoes, and broccoli. Talk to your doctor about whether you need to take iron pills or a multivitamin. · Do not drink alcohol or use tobacco or illegal drugs. · Do not smoke. If you need help quitting, talk to your doctor about stop-smoking programs and medicines. These can increase your chances of quitting for good. When should you call for help? Call 911 anytime you think you may need emergency care. For example, call if: 
· You have sudden, severe pain in your belly or pelvis. · You passed out (lost consciousness). · You have severe vaginal bleeding. Call your doctor now or seek immediate medical care if: 
· You are dizzy or lightheaded, or you feel like you may faint. · You have new or increased pain in your belly or pelvis. · Your vaginal bleeding is getting worse. · You have increased pain in the vaginal area. · You have a fever. · You think you may have passed tissue. Save any tissue that you pass. Take it to your doctor's office as soon as you can. Watch closely for changes in your health, and be sure to contact your doctor if: 
· You have new or worse vaginal discharge. · You do not get better as expected. Where can you learn more? Go to http://danette-ben.info/.  
Enter E819 in the search box to learn more about \"Threatened Miscarriage: Care Instructions. \" Current as of: May 30, 2016 Content Version: 11.2 © 5768-1527 [employer]. Care instructions adapted under license by [employer] (which disclaims liability or warranty for this information). If you have questions about a medical condition or this instruction, always ask your healthcare professional. [address]  any warranty or liability for your use of this information. Patient is discharged home to be on modified bedrest until follow up visit. No heavy lifting, no tampons, no sex, until office visit. Encouraged not to return to work until cleared by Dr. Marcelina Goldberg. If increased bleeding, cramping occurs, patient is to call Dr. Monique Juarez office. Return appointment is next Thursday. If patient has any concerns/questions, she is to call Dr. Monique Juarez office. UPlanMe Activation Thank you for requesting access to UPlanMe. Please follow the instructions below to securely access and download your online medical record. UPlanMe allows you to send messages to your doctor, view your test results, renew your prescriptions, schedule appointments, and more. How Do I Sign Up? 1. In your internet browser, go to [email or web address] 
2. Click on the First Time User? Click Here link in the Sign In box. You will be redirect to the New Member Sign Up page. 3. Enter your UPlanMe Access Code exactly as it appears below. You will not need to use this code after youve completed the sign-up process. If you do not sign up before the expiration date, you must request a new code. UPlanMe Access Code: Activation code not generated Current UPlanMe Status: Patient Declined (This is the date your UPlanMe access code will ) 4. Enter the last four digits of your Social Security Number (xxxx) and Date of Birth (mm/dd/yyyy) as indicated and click Submit. You will be taken to the next sign-up page. 5. Create a YellowSchedulet ID. This will be your All4Staff login ID and cannot be changed, so think of one that is secure and easy to remember. 6. Create a All4Staff password. You can change your password at any time. 7. Enter your Password Reset Question and Answer. This can be used at a later time if you forget your password. 8. Enter your e-mail address. You will receive e-mail notification when new information is available in [address] 9. Click Sign Up. You can now view and download portions of your medical record. 10. Click the Download Summary menu link to download a portable copy of your medical information. Additional Information If you have questions, please visit the Frequently Asked Questions section of the All4Staff website at [email or web address] Remember, All4Staff is NOT to be used for urgent needs. For medical emergencies, dial 911. Discharge Orders None All4Staff Announcement We are excited to announce that we are making your provider's discharge notes available to you in All4Staff. You will see these notes when they are completed and signed by the physician that discharged you from your recent hospital stay. If you have any questions or concerns about any information you see in All4Staff, please call the Health Information Department where you were seen or reach out to your Primary Care Provider for more information about your plan of care. General Information Please provide this summary of care documentation to your next provider. Patient Signature:  ____________________________________________________________ Date:  ____________________________________________________________  
  
Lorrie Bach Provider Signature:  ____________________________________________________________ Date:  ____________________________________________________________ I will SWITCH the dose or number of times a day I take the medications listed below when I get home from the hospital:  None

## 2023-06-21 NOTE — OB PROVIDER DELIVERY SUMMARY - NSPROVIDERDELIVERYNOTE_OBGYN_ALL_OB_FT
Pt fully dilated and pushing.  viable female infant over RML episiotomy. Delayed cord clamping. Cord gases obtained. Placenta delivered spontaneously intact. Episiotomy repaired with 2-0 chromic. Peds in attendance for thick meconium

## 2023-06-21 NOTE — OB RN DELIVERY SUMMARY - NS_SEPSISRSKCALC_OBGYN_ALL_OB_FT
EOS calculated successfully. EOS Risk Factor: 0.5/1000 live births ([facility] national incidence); GA=39w6d; Temp=98.8; ROM=2.617; GBS='Positive'; Antibiotics='No antibiotics or any antibiotics < 2 hrs prior to birth'

## 2023-06-21 NOTE — DISCHARGE NOTE OB - CARE PROVIDER_API CALL
Blade Segovia  Maternal/Fetal Medicine  [address] [facility], [address]  [place] [address]  Phone: [phone]  Fax: [phone]  Follow Up Time:

## 2023-06-21 NOTE — OB RN DELIVERY SUMMARY - NSSELHIDDEN_OBGYN_ALL_OB_FT
[NS_DeliveryAttending1_OBGYN_ALL_OB_FT:[email or web address]],[NS_DeliveryRN_OBGYN_ALL_OB_FT:[id number]=]

## 2023-06-21 NOTE — OB PROVIDER H&P - ASSESSMENT
Evidence of oligohydramnios, discussed findings with Dr. Duong  -Admit to L&D  -Routine labs ordered   -Induce with buccal Cytotec   -Ampicillin for GBS

## 2023-06-21 NOTE — OB PROVIDER DELIVERY SUMMARY - NSLOWPPHRISK_OBGYN_A_OB
No previous uterine incision/[place] Pregnancy/Less than or equal to 4 previous vaginal births/No known bleeding disorder/No history of postpartum hemorrhage

## 2023-06-21 NOTE — OB RN PATIENT PROFILE - FALL HARM RISK - PATIENT NEEDS ASSISTANCE
Additional Notes: Patient consent was obtained to proceed with the visit and recommended plan of care after discussion of all risks and benefits, including the risks of COVID-19 exposure.
Detail Level: Simple
No assistance needed

## 2023-06-21 NOTE — OB RN PATIENT PROFILE - FALL HARM RISK - UNIVERSAL INTERVENTIONS
Bed in lowest position, wheels locked, appropriate side rails in place/Call bell, personal items and telephone in reach/Instruct patient to call for assistance before getting out of bed or chair/Non-slip footwear when patient is out of bed/[place] to call system/Physically safe environment - no spills, clutter or unnecessary equipment/Purposeful Proactive Rounding/Room/bathroom lighting operational, light cord in reach

## 2023-06-21 NOTE — CHART NOTE - NSCHARTNOTEFT_GEN_A_CORE
AN  Patient seen   Examined   7 1/2#  desires epidural  Continue induction of labor with cytotec  CHEO Benton

## 2023-06-22 ENCOUNTER — APPOINTMENT (OUTPATIENT)
Dept: OBGYN | Facility: CLINIC | Age: 38
End: 2023-06-22

## 2023-06-22 DIAGNOSIS — O41.00X0 OLIGOHYDRAMNIOS, UNSPECIFIED TRIMESTER, NOT APPLICABLE OR UNSPECIFIED: ICD-10-CM

## 2023-06-22 RX ORDER — ACETAMINOPHEN 500 MG
3 TABLET ORAL
Qty: 0 | Refills: 0 | DISCHARGE
Start: 2023-06-22

## 2023-06-22 RX ORDER — IBUPROFEN 200 MG
1 TABLET ORAL
Qty: 0 | Refills: 0 | DISCHARGE
Start: 2023-06-22

## 2023-06-22 RX ORDER — IBUPROFEN 200 MG
600 TABLET ORAL EVERY 6 HOURS
Refills: 0 | Status: DISCONTINUED | OUTPATIENT
Start: 2023-06-22 | End: 2023-06-23

## 2023-06-22 RX ADMIN — PRAMOXINE HYDROCHLORIDE 1 APPLICATION(S): 150 AEROSOL, FOAM RECTAL at 06:24

## 2023-06-22 RX ADMIN — Medication 600 MILLIGRAM(S): at 07:07

## 2023-06-22 RX ADMIN — SODIUM CHLORIDE 3 MILLILITER(S): 9 INJECTION INTRAMUSCULAR; INTRAVENOUS; SUBCUTANEOUS at 14:56

## 2023-06-22 RX ADMIN — SODIUM CHLORIDE 3 MILLILITER(S): 9 INJECTION INTRAMUSCULAR; INTRAVENOUS; SUBCUTANEOUS at 22:36

## 2023-06-22 RX ADMIN — Medication 975 MILLIGRAM(S): at 01:05

## 2023-06-22 RX ADMIN — Medication 975 MILLIGRAM(S): at 00:21

## 2023-06-22 RX ADMIN — SODIUM CHLORIDE 3 MILLILITER(S): 9 INJECTION INTRAMUSCULAR; INTRAVENOUS; SUBCUTANEOUS at 07:07

## 2023-06-22 RX ADMIN — Medication 975 MILLIGRAM(S): at 22:00

## 2023-06-22 RX ADMIN — Medication 975 MILLIGRAM(S): at 09:13

## 2023-06-22 RX ADMIN — Medication 975 MILLIGRAM(S): at 14:53

## 2023-06-22 RX ADMIN — Medication 1 APPLICATION(S): at 06:24

## 2023-06-22 RX ADMIN — Medication 975 MILLIGRAM(S): at 21:07

## 2023-06-22 RX ADMIN — Medication 975 MILLIGRAM(S): at 10:00

## 2023-06-22 RX ADMIN — Medication 600 MILLIGRAM(S): at 23:57

## 2023-06-22 RX ADMIN — Medication 975 MILLIGRAM(S): at 15:56

## 2023-06-22 RX ADMIN — Medication 600 MILLIGRAM(S): at 06:23

## 2023-06-23 VITALS
RESPIRATION RATE: 18 BRPM | HEART RATE: 63 BPM | TEMPERATURE: 98 F | DIASTOLIC BLOOD PRESSURE: 72 MMHG | SYSTOLIC BLOOD PRESSURE: 106 MMHG | OXYGEN SATURATION: 99 %

## 2023-06-23 RX ADMIN — Medication 600 MILLIGRAM(S): at 06:01

## 2023-06-23 RX ADMIN — Medication 600 MILLIGRAM(S): at 12:45

## 2023-06-23 RX ADMIN — Medication 600 MILLIGRAM(S): at 00:36

## 2023-06-23 RX ADMIN — Medication 600 MILLIGRAM(S): at 12:07

## 2023-06-23 RX ADMIN — Medication 600 MILLIGRAM(S): at 06:51

## 2023-06-23 NOTE — PROGRESS NOTE ADULT - SUBJECTIVE AND OBJECTIVE BOX
OB Attending Progress Note:  PPD#1    S: 36yo PPD#1 s/p . Patient feels well. Pain is well controlled. She is tolerating a regular diet and passing flatus. She is voiding spontaneously. and ambulating without difficulty. She is breastfeeding. Denies CP/SOB. Denies lightheadedness/dizziness. Denies N/V/D.    O:  Vitals:  Vital Signs Last 24 Hrs  T(C): 36.7 (2023 09:55), Max: 37.1 (2023 20:45)  T(F): 98 (2023 09:55), Max: 98.78 (2023 20:45)  HR: 72 (2023 09:55) (57 - 157)  BP: 104/64 (2023 09:55) (91/56 - 137/92)  BP(mean): --  RR: 18 (2023 09:55) (16 - 20)  SpO2: 99% (2023 09:55) (92% - 100%)    Parameters below as of 2023 06:48  Patient On (Oxygen Delivery Method): room air        MEDICATIONS  (STANDING):  acetaminophen     Tablet .. 975 milliGRAM(s) Oral <User Schedule>  diphtheria/tetanus/pertussis (acellular) Vaccine (Adacel) 0.5 milliLiter(s) IntraMuscular once  ibuprofen  Tablet. 600 milliGRAM(s) Oral every 6 hours  prenatal multivitamin 1 Tablet(s) Oral daily  sodium chloride 0.9% lock flush 3 milliLiter(s) IV Push every 8 hours      Labs:  Blood type: B Positive  Rubella IgG: RPR: Negative                          9.7<L>   10.04 >-----------< 261    (  @ 01:30 )             29.3<L>                  Physical Exam:  General: NAD  CV: RR  Pulm: Breathing comfortably on RA  Abdomen: soft, non-tender, non-distended, +BS, fundus firm  Vaginal: Lochia wnl  Extremities: No erythema/edema    A/P: 36yo PPD#1 s/p .   - Pain well controlled, continue current pain regimen  - Increase ambulation, SCDs when not ambulating  - Continue regular diet  - Discharge planning tomorrow      Bonnie Watesr MD
S: Patient doing well. No complaints. Minimal lochia. Pain controlled.    O: Vital Signs Last 24 Hrs  T(C): 36.9 (2023 06:38), Max: 36.9 (2023 06:38)  T(F): 98.4 (2023 06:38), Max: 98.4 (2023 06:38)  HR: 63 (2023 06:38) (63 - 76)  BP: 106/72 (2023 06:38) (105/64 - 110/72)  BP(mean): --  RR: 18 (2023 06:38) (17 - 18)  SpO2: 99% (2023 06:38) (99% - 100%)    Parameters below as of 2023 06:38  Patient On (Oxygen Delivery Method): room air        Gen: NAD  Abd: soft, Nontender, Nondistended, fundus firm  Ext: no tendern, mild edema    Labs:      A: 37y PPD#2 s/p  doing well.    Plan:  Routine postpartum care  Encouraged out of bed  Regular diet

## 2023-06-26 ENCOUNTER — APPOINTMENT (OUTPATIENT)
Dept: INFUSION THERAPY | Facility: HOSPITAL | Age: 38
End: 2023-06-26

## 2023-07-06 PROBLEM — D64.9 ANEMIA, UNSPECIFIED: Chronic | Status: ACTIVE | Noted: 2023-06-21

## 2023-08-08 ENCOUNTER — APPOINTMENT (OUTPATIENT)
Dept: OBGYN | Facility: CLINIC | Age: 38
End: 2023-08-08
Payer: COMMERCIAL

## 2023-08-08 VITALS
BODY MASS INDEX: 21.68 KG/M2 | DIASTOLIC BLOOD PRESSURE: 93 MMHG | HEIGHT: 64 IN | WEIGHT: 127 LBS | SYSTOLIC BLOOD PRESSURE: 141 MMHG

## 2023-08-08 PROCEDURE — 0503F POSTPARTUM CARE VISIT: CPT

## 2023-08-10 NOTE — HISTORY OF PRESENT ILLNESS
[] : delivered by vaginal delivery [Complications:___] : no complications [Delivery Date: ___] : on [unfilled] [Breastfeeding] : currently nursing [Resumed [place]] : has not resumed intercourse [Intended Contraception] : Intended Contraception: [Oral Contraceptives] : oral contraceptives [Back to Normal] : is back to normal in size [Normal] : the vagina was normal [Doing Well] : is doing well [No Sign of Infection] : is showing no signs of infection [Excellent Pain Control] : has excellent pain control [None] : None [FreeTextEntry8] : 37 y.o. P3 s/p  presenting for a pp6 visit. She is doing well and is without complaints.  [de-identified] : 37 y.o. P3 s/p  presenting for a pp6 visit [de-identified] : Normal pp6 visit. Plan for POP for contraception. Discussed importance of taking at the exact time every day. Cleared to resume all activities including exercise and intercourse. f/u 3-4 months for annual exam

## 2023-09-11 NOTE — OB PROVIDER TRIAGE NOTE - NS_SPECEXAM_OBGYN_ALL_OB
You can use the albuterol inhaler - 2 puffs every 4 hours as needed for wheezing/shortness of breath  
No

## 2023-09-12 ENCOUNTER — OUTPATIENT (OUTPATIENT)
Dept: OUTPATIENT SERVICES | Facility: HOSPITAL | Age: 38
LOS: 1 days | Discharge: HOME | End: 2023-09-12

## 2023-09-12 DIAGNOSIS — D75.81 MYELOFIBROSIS: ICD-10-CM

## 2023-09-12 DIAGNOSIS — Z90.721 ACQUIRED ABSENCE OF OVARIES, UNILATERAL: Chronic | ICD-10-CM

## 2023-09-21 ENCOUNTER — RESULT REVIEW (OUTPATIENT)
Age: 38
End: 2023-09-21

## 2023-09-21 ENCOUNTER — APPOINTMENT (OUTPATIENT)
Dept: HEMATOLOGY ONCOLOGY | Facility: CLINIC | Age: 38
End: 2023-09-21
Payer: COMMERCIAL

## 2023-09-21 ENCOUNTER — NON-APPOINTMENT (OUTPATIENT)
Age: 38
End: 2023-09-21

## 2023-09-21 VITALS
OXYGEN SATURATION: 97 % | WEIGHT: 125.64 LBS | TEMPERATURE: 97 F | HEART RATE: 55 BPM | HEIGHT: 63.98 IN | BODY MASS INDEX: 21.45 KG/M2 | SYSTOLIC BLOOD PRESSURE: 147 MMHG | RESPIRATION RATE: 17 BRPM | DIASTOLIC BLOOD PRESSURE: 92 MMHG

## 2023-09-21 DIAGNOSIS — D57.3 SICKLE-CELL TRAIT: ICD-10-CM

## 2023-09-21 LAB
BASOPHILS # BLD AUTO: 0.04 K/UL — SIGNIFICANT CHANGE UP (ref 0–0.2)
BASOPHILS NFR BLD AUTO: 0.7 % — SIGNIFICANT CHANGE UP (ref 0–2)
EOSINOPHIL # BLD AUTO: 0.16 K/UL — SIGNIFICANT CHANGE UP (ref 0–0.5)
EOSINOPHIL NFR BLD AUTO: 2.6 % — SIGNIFICANT CHANGE UP (ref 0–6)
HCT VFR BLD CALC: 37.5 % — SIGNIFICANT CHANGE UP (ref 34.5–45)
HGB BLD-MCNC: 12.7 G/DL — SIGNIFICANT CHANGE UP (ref 11.5–15.5)
IMM GRANULOCYTES NFR BLD AUTO: 0.2 % — SIGNIFICANT CHANGE UP (ref 0–0.9)
LYMPHOCYTES # BLD AUTO: 1.81 K/UL — SIGNIFICANT CHANGE UP (ref 1–3.3)
LYMPHOCYTES # BLD AUTO: 29.4 % — SIGNIFICANT CHANGE UP (ref 13–44)
MCHC RBC-ENTMCNC: 27.5 PG — SIGNIFICANT CHANGE UP (ref 27–34)
MCHC RBC-ENTMCNC: 33.9 G/DL — SIGNIFICANT CHANGE UP (ref 32–36)
MCV RBC AUTO: 81.2 FL — SIGNIFICANT CHANGE UP (ref 80–100)
MONOCYTES # BLD AUTO: 0.57 K/UL — SIGNIFICANT CHANGE UP (ref 0–0.9)
MONOCYTES NFR BLD AUTO: 9.3 % — SIGNIFICANT CHANGE UP (ref 2–14)
NEUTROPHILS # BLD AUTO: 3.56 K/UL — SIGNIFICANT CHANGE UP (ref 1.8–7.4)
NEUTROPHILS NFR BLD AUTO: 57.8 % — SIGNIFICANT CHANGE UP (ref 43–77)
NRBC # BLD: 0 /100 WBCS — SIGNIFICANT CHANGE UP (ref 0–0)
PLATELET # BLD AUTO: 215 K/UL — SIGNIFICANT CHANGE UP (ref 150–400)
RBC # BLD: 4.62 M/UL — SIGNIFICANT CHANGE UP (ref 3.8–5.2)
RBC # FLD: 16.6 % — HIGH (ref 10.3–14.5)
WBC # BLD: 6.15 K/UL — SIGNIFICANT CHANGE UP (ref 3.8–10.5)
WBC # FLD AUTO: 6.15 K/UL — SIGNIFICANT CHANGE UP (ref 3.8–10.5)

## 2023-09-21 PROCEDURE — 99213 OFFICE O/P EST LOW 20 MIN: CPT

## 2023-09-22 LAB
FERRITIN SERPL-MCNC: 94 NG/ML
IRON SATN MFR SERPL: 24 %
IRON SERPL-MCNC: 70 UG/DL
TIBC SERPL-MCNC: 287 UG/DL
UIBC SERPL-MCNC: 217 UG/DL

## 2023-10-15 NOTE — OB PROVIDER TRIAGE NOTE - NSPREVIOUSLIVEBIRTHSNOWDEAD_OBGYN_ALL_OB_NU
[facility] INPATIENT ENCOUNTER  PEDIATRICS DAILY PROGRESS NOTE    ADMISSION DATE:  10/9/2023  DATE:  10/11/2023  CURRENT HOSPITAL DAY:  Hospital Day: 7   ATTENDING PHYSICIAN:  Priscilla Orona MD  CODE STATUS:  Selective Treatment/DNR    CHIEF COMPLAINT:  Cough and increased work of breathing     ACTIVE PROBLEMS:    Active Hospital Problems    Diagnosis    • Cerebral palsy, unspecified (CMD)    • Pneumonia of right lower lobe due to infectious organism    • Acute on chronic respiratory failure with hypoxia (CMD)    • Holoprosencephaly (CMD)    • Generalized convulsive epilepsy (CMD)    • Gastrostomy status (CMD)    • History of recurrent pneumonia    • Restrictive lung disease    • Dysphagia, oropharyngeal phase        INTERVAL HISTORY:    Jessica Gong is a 6 year old female patient admitted with Pneumonia of right lower lobe due to infectious organism [J18.9].    Continues to require suctioning, CPT, and cough assist overnight. O2 remained at 8L 40%. Did not tolerate NC trial on 10/14. Tolerating her G tube feeds well. No fever.       REVIEW OF SYSTEMS:  Review of Systems   Constitutional: Negative for fever.   HENT: Negative for sore throat.    Respiratory: Negative for cough and shortness of breath.    Cardiovascular: Negative for chest pain.   Gastrointestinal: Negative for nausea and vomiting.       MEDICATIONS:    Current Facility-Administered Medications   Medication Dose Route Frequency Provider Last Rate Last Admin   • albuterol inhaler 4 puff  4 puff Inhalation Q4H Resp Ajay Vazquez DO   4 puff at 10/15/23 0425   • ampicillin-sulbactam (UNASYN) 30 mg of ampicillin/mL in sodium chloride 0.9% IVPB syringe 1,290 mg of ampicillin 43 mL total volume  50 mg/kg of ampicillin Intravenous Q6H Naty High MD 86 mL/hr at 10/15/23 0213 1,290 mg of ampicillin at 10/15/23 0213   • lidocaine (ANECREAM/LMX) 4 % cream 1 application.  1 application. Topical PRN Sobeida Malone DO       • sodium  chloride 0.9 % injection 0.6-4.6 mL  0.6-4.6 mL Intravenous PRN Sobeida Malone DO       • sodium chloride 0.9 % flush bag 25 mL  25 mL Intravenous PRN Sobeida Malone DO       • sodium chloride 0.9 % injection 0.5-10 mL  0.5-10 mL Intravenous PRN Sobeida Malone DO       • levETIRAcetam ORAL (KepPRA) 100 MG/ML oral solution 600 mg  600 mg Per PEG Tube BID Ajay Vazquez DO   600 mg at 10/14/23 2126   • OXcarbazepine (TRILEPTAL) 300 MG/5ML suspension 240 mg  240 mg Per PEG Tube BID Ajay Vazquez DO   240 mg at 10/14/23 2126   • cloBAZam (ONFI) 2.5 MG/ML suspension 10 mg  10 mg Per PEG Tube BID Ajay Vazquez, DO   10 mg at 10/14/23 2126   • fluticasone propionate (FLOVENT HFA) 110 MCG/ACT inhaler 4 puff  4 puff Inhalation BID Resp Naty High MD   4 puff at 10/14/23 2037   • LORazepam (ATIVAN) injection 2.56 mg  0.1 mg/kg (Dosing Weight) Intravenous On Call PRN Naty High MD           OBJECTIVE:    VITAL SIGNS:     Vital Last Value 24 Hour Range   Temperature 97.5 °F (36.4 °C) (10/15/23 0405) Temp  Min: 97 °F (36.1 °C)  Max: 98.2 °F (36.8 °C)   Pulse (!) 60 (pt sleeping) (10/15/23 0405) Pulse  Min: 60  Max: 111   Respiratory 24 (10/15/23 0425) Resp  Min: 20  Max: 26   Non-Invasive  Blood Pressure (!) 101/54 (pt sleeping) (10/15/23 0405) BP  Min: 91/47  Max: 114/64   Pulse Oximetry 93 % (10/15/23 0405) SpO2  Min: 84 %  Max: 96 %     Vital Today Admitted   Weight 25.5 kg (56 lb 3.5 oz) (10/09/23 1349) Weight: 25.5 kg (56 lb 3.5 oz) (10/09/23 1054)   Height N/A Height: 3' 7\" (109.2 cm) (10/09/23 1349)   Body Mass Index N/A BMI (Calculated): 21.38 (10/09/23 1349)     INTAKE/OUTPUT:      Intake/Output Summary (Last 24 hours) at 10/15/2023 0740  Last data filed at 10/14/2023 2130  Gross per 24 hour   Intake 1548 ml   Output 496 ml   Net 1052 ml         PHYSICAL EXAM:    Physical Exam  Constitutional:       General: She is not in acute distress.     Appearance: She is not  toxic-appearing.   HENT:      Right Ear: External ear normal.      Left Ear: External ear normal.      Mouth/Throat:      Mouth: Mucous membranes are moist.   Eyes:      General:         Right eye: No discharge.         Left eye: No discharge.      Conjunctiva/sclera: Conjunctivae normal.   Cardiovascular:      Rate and Rhythm: Normal rate and regular rhythm.      Pulses: Normal pulses.      Heart sounds: Normal heart sounds.   Pulmonary:      Effort: Pulmonary effort is normal. No nasal flaring or retractions.      Breath sounds: No stridor or decreased air movement. Rhonchi present. No wheezing.      Comments: Mildly decreased breath sounds throughout all fields likely 2/2 hypotonia  Abdominal:      Comments: G tube C/D/I   Skin:     General: Skin is warm and dry.      Capillary Refill: Capillary refill takes less than 2 seconds.      Comments: L foot bullae with mild bruising   Neurological:      Mental Status: She is alert.      Comments: Extremity myclonus         LABORATORY DATA:    No results found for this or any previous visit (from the past 24 hour(s)).     IMAGING STUDIES:    XR CHEST PA OR AP 1 VIEW    Result Date: 10/9/2023  Narrative: EXAMINATION: XR CHEST AP OR PA  EXAM DATE: 10/9/2023 11:10 AM CLINICAL HISTORY: 6 years Female SOB  COMPARISON: August 11, 2023  FINDINGS: Lines, tubes, and devices: Partially included ventriculoperitoneal shunt extends along the right lower neck, chest, upper abdomen.. Cardiomediastinal silhouette: Prominent cardiothymic silhouette is exaggerated by low inspiratory volumes and position. Lungs and pleura: Patchy bibasilar opacities, predominate in the right base. Mild elevation of the right hemidiaphragm is similar to prior. No discrete pleural effusion. No pneumothorax. Abdomen: Mild gaseous distention of the bowel in the included upper abdomen. No gross pneumoperitoneum.     Impression: Patchy opacities concerning for right basilar pneumonia. Consider a lateral view  when clinically feasible. Electronically Signed by: DAVID LIEBERMAN M.D. Signed on: 10/9/2023 11:40 AM Workstation ID: [id number]                               Assessment:     Jessica Gong is a 6 year old female ex 24 week GA,  shunt, seizures, CLD and recurrent PNA, asthma, dysphagia and GT dependence, CP, DD admitted for increased O2 requirement from baseline in the setting of RLL PNA. Continues to require CPT, deep suctioning, and cough assist. Patient currently on IV Unasyn for a total of 7 days. Completed 5d orapred. She is otherwise hemodynamically stable.     ACTIVE PROBLEMS:    Active Hospital Problems    Diagnosis    • Cerebral palsy, unspecified (CMD)    • Pneumonia of right lower lobe due to infectious organism    • Acute on chronic respiratory failure with hypoxia (CMD)    • Holoprosencephaly (CMD)    • Generalized convulsive epilepsy (CMD)    • Gastrostomy status (CMD)    • History of recurrent pneumonia    • Restrictive lung disease    • Dysphagia, oropharyngeal phase             Plan:  FEN/GI:         - Nestle Compleat 250 mL TID; 120 mL FWF before and after feeds        - No IVF needed if continue to tolerate feeds        - Daily Weights     Resp:   - Continue Venturi Mask; wean as tolerated  - CPT with cough assist q4h  - Albuterol 4 puffs every 3 hours   - Flovent 440 mcg 2 puff BID  - S/p Orapred 25mg BID (s10/1-10/15) x5d  - Baseline O2: 1.5 L NC at night     CV:   -Currently hemodynamically stable, continue to monitor      ID:  - Continue Unasyn 50 mg/kg q6h (s10/9) x7d  - Isolation per protocol      Neuro:      - Onfi 10 mg twice daily PEG      - Keppra 600 mg twice daily PEG  - Trileptal 240 mg twice daily PEG     Antibiotic Decision Making  Patient on Antibiotics:  - Yes; (1) Type of Infection ---- Therapeutic (Documented Infection)  (2) Diagnosis for Antibiotics ---- Pneumonia  (3) Diagnostic Work Up done ---- XR of chest        Lines: PIV  The utilization, functionality and  necessity of all lines has been discussed and confirmed.      VTE: 0 (None), at baseline mobility, SCDs not indicated     Dispo: back to O2 baseline NC/Pending improvement in respiratory status      Plan was discussed with supervising attending. Above assessment and plan was discussed with family who agreed. All questions addressed.     Naty High MD  PGY2 Pediatrics  Please page via [employer]             0

## 2023-11-06 ENCOUNTER — NON-APPOINTMENT (OUTPATIENT)
Age: 38
End: 2023-11-06

## 2023-11-07 ENCOUNTER — APPOINTMENT (OUTPATIENT)
Dept: OBGYN | Facility: CLINIC | Age: 38
End: 2023-11-07
Payer: COMMERCIAL

## 2023-11-07 VITALS
DIASTOLIC BLOOD PRESSURE: 87 MMHG | HEIGHT: 64 IN | SYSTOLIC BLOOD PRESSURE: 135 MMHG | BODY MASS INDEX: 22.02 KG/M2 | WEIGHT: 129 LBS

## 2023-11-07 DIAGNOSIS — Z30.09 ENCOUNTER FOR OTHER GENERAL COUNSELING AND ADVICE ON CONTRACEPTION: ICD-10-CM

## 2023-11-07 PROCEDURE — 99214 OFFICE O/P EST MOD 30 MIN: CPT

## 2023-11-21 ENCOUNTER — OUTPATIENT (OUTPATIENT)
Dept: OUTPATIENT SERVICES | Facility: HOSPITAL | Age: 38
LOS: 1 days | Discharge: HOME | End: 2023-11-21

## 2023-11-21 DIAGNOSIS — D64.9 ANEMIA, UNSPECIFIED: ICD-10-CM

## 2023-11-21 DIAGNOSIS — D57.3 SICKLE-CELL TRAIT: ICD-10-CM

## 2023-11-21 DIAGNOSIS — Z90.721 ACQUIRED ABSENCE OF OVARIES, UNILATERAL: Chronic | ICD-10-CM

## 2023-11-24 DIAGNOSIS — D64.9 ANEMIA, UNSPECIFIED: ICD-10-CM

## 2023-11-27 ENCOUNTER — APPOINTMENT (OUTPATIENT)
Dept: OBGYN | Facility: CLINIC | Age: 38
End: 2023-11-27

## 2023-12-07 ENCOUNTER — APPOINTMENT (OUTPATIENT)
Dept: HEMATOLOGY ONCOLOGY | Facility: CLINIC | Age: 38
End: 2023-12-07

## 2023-12-18 ENCOUNTER — APPOINTMENT (OUTPATIENT)
Dept: OBGYN | Facility: CLINIC | Age: 38
End: 2023-12-18
Payer: COMMERCIAL

## 2023-12-18 VITALS
DIASTOLIC BLOOD PRESSURE: 90 MMHG | BODY MASS INDEX: 21.68 KG/M2 | HEIGHT: 64 IN | WEIGHT: 127 LBS | SYSTOLIC BLOOD PRESSURE: 143 MMHG

## 2023-12-18 VITALS — DIASTOLIC BLOOD PRESSURE: 84 MMHG | SYSTOLIC BLOOD PRESSURE: 131 MMHG

## 2023-12-18 DIAGNOSIS — Z3A.23 23 WEEKS GESTATION OF PREGNANCY: ICD-10-CM

## 2023-12-18 DIAGNOSIS — Z01.419 ENCOUNTER FOR GYNECOLOGICAL EXAMINATION (GENERAL) (ROUTINE) W/OUT ABNORMAL FINDINGS: ICD-10-CM

## 2023-12-18 DIAGNOSIS — O09.529 SUPERVISION OF ELDERLY MULTIGRAVIDA, UNSPECIFIED TRIMESTER: ICD-10-CM

## 2023-12-18 DIAGNOSIS — Z3A.15 15 WEEKS GESTATION OF PREGNANCY: ICD-10-CM

## 2023-12-18 PROCEDURE — 99396 PREV VISIT EST AGE 40-64: CPT

## 2023-12-18 RX ORDER — MULTIVITAMIN
TABLET ORAL
Refills: 0 | Status: ACTIVE | COMMUNITY

## 2023-12-18 RX ORDER — NORETHINDRONE 0.35 MG/1
0.35 TABLET ORAL DAILY
Qty: 3 | Refills: 3 | Status: ACTIVE | COMMUNITY
Start: 2023-11-07 | End: 1900-01-01

## 2023-12-19 LAB
C TRACH RRNA SPEC QL NAA+PROBE: NOT DETECTED
N GONORRHOEA RRNA SPEC QL NAA+PROBE: NOT DETECTED
SOURCE AMPLIFICATION: NORMAL

## 2023-12-20 NOTE — PLAN
[FreeTextEntry1] : 39 y/o female presenting for annual exam: -f/u pap and GC/CT -Requisition given for mammogram -Contraception: mini pill, refill for Ingris sent to pharmacy -elevated bp noted in office today. Patient states that bp wnl when recently evaluated by pcp. -f/u PRN

## 2023-12-20 NOTE — PHYSICAL EXAM
[Chaperone Present] : A chaperone was present in the examining room during all aspects of the physical examination [Appropriately responsive] : appropriately responsive [Alert] : alert [No Acute Distress] : no acute distress [Soft] : soft [Non-tender] : non-tender [Non-distended] : non-distended [No HSM] : No HSM [No Lesions] : no lesions [No Mass] : no mass [Oriented x3] : oriented x3 [Examination Of The Breasts] : a normal appearance [No Masses] : no breast masses were palpable [Labia Majora] : normal [Labia Minora] : normal [Normal] : normal [Uterine Adnexae] : normal [FreeTextEntry1] : Jackie

## 2023-12-20 NOTE — HISTORY OF PRESENT ILLNESS
[Y] : Positive pregnancy history [No] : Patient does not have concerns regarding sex [Previously active] : previously active [Patient refuses STI testing] : Patient refuses STI testing [PGHxTotal] : 4 [[facility]] : 3 [PGHxPremature] : 0 [PGHxAbortions] : 0 [[facility]] : 3 [PGHxABInduced] : 0 [PGHxABSpont] : 0 [PGxEctopic] : 1 [PGHxMultBirths] : 0 [FreeTextEntry1] :  x 3, ectopic x1

## 2023-12-21 LAB
CYTOLOGY CVX/VAG DOC THIN PREP: ABNORMAL
HPV HIGH+LOW RISK DNA PNL CVX: NOT DETECTED

## 2023-12-22 LAB
HPV 16 E6+E7 MRNA CVX QL NAA+PROBE: NOT DETECTED
HPV18+45 E6+E7 MRNA CVX QL NAA+PROBE: NOT DETECTED

## 2024-01-24 NOTE — OB PROVIDER IHI INDUCTION/AUGMENTATION NOTE - NS_IHIMETHOD_OBGYN_ALL_OB
We are committed to providing you with the best care possible.   In order to help us achieve these goals please remember to bring all medications, herbal products, and over the counter supplements with you to each visit.     If your provider has ordered testing for you, please be sure to follow up with our office if you have not received results within 7 days after the testing took place.     *If you receive a survey after visiting one of our offices, please take time to share your experience concerning your physician office visit. These surveys are confidential and no health information about you is shared.  We are eager to improve for you and we are counting on your feedback to help make that happen.    [employer] PO

## 2024-11-10 NOTE — OB RN PATIENT PROFILE - NS_FINALEDD_OBGYN_ALL_OB_DT
You were seen in the emergency department today for pain and redness to your right upper arm.    Workup today is consistent with cellulitis with a small abscess to the back of your right elbow  You do have an elevated white count.  Initial lactic acid was elevated however it is well below normal after fluids today.    You were given 2 different kinds of IV antibiotics.  1 was a cephalosporin and the other was linezolid    You look well.  Otherwise her physical exam is reassuring.  Other markers for severe infection such as Pro-Lester are well within normal limits    You have opted to go home.  I think that this is reasonable.    I have used a skin marker to date and time redness to your arm.    I have written you for 2 antibiotics that you will start tomorrow.  1 is called Keflex and the other is called Bactrim.  Finish the entire course of these antibiotics.    I have written you for a narcotic pain medication called Norco.  Do not drink alcohol or drive on this medication.  This medication can cause grogginess, fogginess, sleepiness.    Narcotics can cause constipation.  I do recommend you take a stool softener while you take narcotics.  You do not want another issue.    I have written you for Zofran as narcotics can cause nausea    Recommend pushing plenty of fluids.  Recommend warm compresses to your arm in 20-minute increments    Redness may continue to spread for the first 3 days of being on antibiotics; however, if it continues to spread after 3 days of being on antibiotics, you are developing significant fevers, significantly feeling terrible, developing nausea vomiting and diarrhea are generally getting worse please return to the emergency department    If your blood cultures are positive you will receive a phone call requesting that you come back to the ER for admission.  Sometimes blood cultures may be positive with normal skin bacteria.  They may call you and ask you how you are doing.  If you are feeling  22-Jun-2023

## 2025-03-19 ENCOUNTER — NON-APPOINTMENT (OUTPATIENT)
Age: 40
End: 2025-03-19

## 2025-03-21 ENCOUNTER — APPOINTMENT (OUTPATIENT)
Dept: OBGYN | Facility: CLINIC | Age: 40
End: 2025-03-21
Payer: COMMERCIAL

## 2025-03-21 VITALS — BODY MASS INDEX: 24.03 KG/M2 | SYSTOLIC BLOOD PRESSURE: 113 MMHG | WEIGHT: 140 LBS | DIASTOLIC BLOOD PRESSURE: 79 MMHG

## 2025-03-21 DIAGNOSIS — Z01.419 ENCOUNTER FOR GYNECOLOGICAL EXAMINATION (GENERAL) (ROUTINE) W/OUT ABNORMAL FINDINGS: ICD-10-CM

## 2025-03-21 DIAGNOSIS — Z33.1 PREGNANT STATE, INCIDENTAL: ICD-10-CM

## 2025-03-21 DIAGNOSIS — Z86.32 PERSONAL HISTORY OF GESTATIONAL DIABETES: ICD-10-CM

## 2025-03-21 PROCEDURE — 99396 PREV VISIT EST AGE 40-64: CPT

## 2025-03-21 PROCEDURE — 99459 PELVIC EXAMINATION: CPT | Mod: NC

## 2025-03-27 LAB
C TRACH RRNA SPEC QL NAA+PROBE: NOT DETECTED
HPV 16 E6+E7 MRNA CVX QL NAA+PROBE: NOT DETECTED
HPV HIGH+LOW RISK DNA PNL CVX: NOT DETECTED
HPV HIGH+LOW RISK DNA PNL CVX: NOT DETECTED
HPV18+45 E6+E7 MRNA CVX QL NAA+PROBE: NOT DETECTED
N GONORRHOEA RRNA SPEC QL NAA+PROBE: NOT DETECTED
SOURCE AMPLIFICATION: NORMAL

## 2025-03-28 LAB — CYTOLOGY CVX/VAG DOC THIN PREP: ABNORMAL

## 2025-04-28 NOTE — OB RN DELIVERY SUMMARY - NSDELIVERYTYPEA_OBGYN_ALL_OB
OB H&P    Provider: Dr. Mahogany DELGADO  Walter Pritchett is a 35 year old  @[id number] d/b L=7 p/f repeat .    Denies loss of fluid, contractions, decreased fetal movement, vaginal bleeding.    Pregnancy c/b:  Hypothyroidism  Eczema  H/o  x2    OB history   OB History    Para Term  AB Living   4 2 2  1 2   SAB IAB Ectopic Molar Multiple Live Births   1    0 2      # Outcome Date GA Lbr Rajesh/2nd Weight Sex Type Anes PTL Lv   4 Current            3 Term 20 39w1d  2830 g (6 lb 3.8 oz) M CS-LTranv Spinal N CARLOS   2 SAB      D&C      1 Term     M CS-LTranv   CARLOS      Complications: Fetal Intolerance     Gyn history denies abn pap, stis, ovarian cysts, uterine fibroids    Prenatal Labs   Blood type:   Lab Results   Component Value Date    ABORHDABR O Rh Positive 2025     Antibody Screen:   Lab Results   Component Value Date    AS Negative 2025     Rubella:   Lab Results   Component Value Date    RUBEL Immune 2021     Hep B:   Lab Results   Component Value Date    HBAG negative 2021     Syphilis: No results found for: \"SYPGM\"; No results found for: \"MHATPL\"  HIV:   Lab Results   Component Value Date    HIV Nonreactive 2022     Chlamydia: No results found for: \"CHPT\"   Gonorrhea: No results found for: \"GCPT\"  1hr OGTT: No results found for: \"DSPG\"  3hr OGTT: No results found for: \"GLU0\" , No results found for: \"GLU1\" , No results found for: \"GLU2\" , No results found for: \"GLU3\"  GBS: No results found for: \"GBS\"    Past medical history  Past Medical History:   Diagnosis Date    Eczema     Thyroid disease     hypothyroid        Past surgical history   has a past surgical history that includes Bunionectomy ( & ); Coulters tooth extraction (); Bunionectomy; and  section, low transverse.    Family history  family history includes Heart disease in her father; Hypertension in her father and mother.    Hospital Meds  Current  Facility-Administered Medications   Medication Dose Route Frequency Provider Last Rate Last Admin    oxyTOCIN (PITOCIN) 30 Units in sodium chloride 0.9% 500 mL  0-334 mL/hr Intravenous Continuous Cheri Calle MD        sodium chloride 0.9 % injection 10 mL  10 mL Intravenous PRN Cheri Calle MD        sodium chloride 0.9 % injection 2 mL  2 mL Intracatheter 2 times per day Cheri Calle MD        lactated ringers infusion   Intravenous Continuous Cheri Calle MD        lactated ringers bolus 1,000 mL  1,000 mL Intravenous Once Cheri Calle MD        acetaminophen (TYLENOL) tablet 1,000 mg  1,000 mg Oral On Call to Procedure Cheri Calle MD        ceFAZolin (ANCEF) 2,000 mg in sterile water (preservative free) IV syringe  2,000 mg Intravenous On Call to Procedure Cheri Calle MD           Allergies  has no known allergies.    Social history   Social History     Tobacco Use    Smoking status: Never    Smokeless tobacco: Never   Vaping Use    Vaping status: never used   Substance Use Topics    Alcohol use: Not Currently    Drug use: Never       Objective  Visit Vitals  /89   Pulse 70   Temp 98.1 °F (36.7 °C) (Oral)   Resp 18   Ht 5' 7\" (1.702 m)   Wt 96.2 kg (212 lb)   LMP 07/29/2024 (Exact Date)   BMI 33.20 kg/m²      Body mass index is 33.2 kg/m².    General: no acute distress, alert, resting comfortably, aaox3  Heart: regular rate   Lungs: no increased work of breathing.  Abdomen: soft, nontender, nondistended, no abnormal masses, gravid.  Neurological: Normal speech. No focal findings or movement disorder noted.  Psychological: cooperative, appropriate mood and affect, normal judgement.    EFM: baseline 140, moderate variability, +accels, -decels  [place]: acontractile    Limited US: cephalic, placenta anterior    EFW: 3500g by palpation    Labs:  Recent Labs   Lab 04/26/25  1047   WBC 10.2   HGB 12.9   HCT 38.9        No results found    Invalid input(s): \"CL\", \"BILITOT\",  \"ALKPHOS\"    Assessment/plan  35 year old  at 39w0d d/b L=7 a/f Repeat     CSECTION  - Admit to L&D  - NPO, IVF  -  CBC, T&S, RPR reviewed  - FWB: NICHD Cat 1  - CEFM/TOCO  - Informed consent obtained by Dr. Vides  - Ancef to OR  - SCDs to OR   - Will notify Anesthesia   - Contraception: None    To be discussed with Dr. Vides and OB team.    Cheri Calle MD  Obstetrics & Gynecology, PGY-2     Vaginal Delivery

## 2025-08-23 ENCOUNTER — RESULT REVIEW (OUTPATIENT)
Age: 40
End: 2025-08-23

## 2025-08-23 ENCOUNTER — APPOINTMENT (OUTPATIENT)
Dept: MAMMOGRAPHY | Facility: IMAGING CENTER | Age: 40
End: 2025-08-23
Payer: COMMERCIAL

## 2025-08-23 ENCOUNTER — OUTPATIENT (OUTPATIENT)
Dept: OUTPATIENT SERVICES | Facility: HOSPITAL | Age: 40
LOS: 1 days | End: 2025-08-23
Payer: COMMERCIAL

## 2025-08-23 DIAGNOSIS — Z01.419 ENCOUNTER FOR GYNECOLOGICAL EXAMINATION (GENERAL) (ROUTINE) WITHOUT ABNORMAL FINDINGS: ICD-10-CM

## 2025-08-23 DIAGNOSIS — Z90.721 ACQUIRED ABSENCE OF OVARIES, UNILATERAL: Chronic | ICD-10-CM

## 2025-08-23 PROCEDURE — 77067 SCR MAMMO BI INCL CAD: CPT | Mod: 26

## 2025-08-23 PROCEDURE — 77063 BREAST TOMOSYNTHESIS BI: CPT

## 2025-08-23 PROCEDURE — 77063 BREAST TOMOSYNTHESIS BI: CPT | Mod: 26

## 2025-08-23 PROCEDURE — 77067 SCR MAMMO BI INCL CAD: CPT
